# Patient Record
Sex: FEMALE | Race: WHITE | Employment: OTHER | URBAN - METROPOLITAN AREA
[De-identification: names, ages, dates, MRNs, and addresses within clinical notes are randomized per-mention and may not be internally consistent; named-entity substitution may affect disease eponyms.]

---

## 2017-01-06 ENCOUNTER — TELEPHONE (OUTPATIENT)
Dept: ORTHOPEDIC SURGERY | Age: 77
End: 2017-01-06

## 2017-01-19 VITALS — RESPIRATION RATE: 16 BRPM | WEIGHT: 160 LBS | HEIGHT: 60 IN | BODY MASS INDEX: 31.41 KG/M2

## 2017-01-20 ENCOUNTER — TELEPHONE (OUTPATIENT)
Dept: ORTHOPEDIC SURGERY | Age: 77
End: 2017-01-20

## 2017-01-20 ENCOUNTER — OFFICE VISIT (OUTPATIENT)
Dept: ORTHOPEDIC SURGERY | Age: 77
End: 2017-01-20

## 2017-01-20 DIAGNOSIS — M75.102 TEAR OF LEFT ROTATOR CUFF, UNSPECIFIED TEAR EXTENT: ICD-10-CM

## 2017-01-20 DIAGNOSIS — Z01.818 PREOP EXAMINATION: ICD-10-CM

## 2017-01-20 DIAGNOSIS — M67.431 GANGLION CYST OF WRIST, RIGHT: Primary | ICD-10-CM

## 2017-01-20 PROCEDURE — 99213 OFFICE O/P EST LOW 20 MIN: CPT | Performed by: ORTHOPAEDIC SURGERY

## 2017-01-20 RX ORDER — OXYCODONE HYDROCHLORIDE AND ACETAMINOPHEN 5; 325 MG/1; MG/1
1 TABLET ORAL EVERY 4 HOURS PRN
Qty: 30 TABLET | Refills: 0 | Status: SHIPPED | OUTPATIENT
Start: 2017-01-20 | End: 2018-10-19

## 2017-01-20 RX ORDER — PROMETHAZINE HYDROCHLORIDE 25 MG/1
25 TABLET ORAL EVERY 8 HOURS PRN
Qty: 10 TABLET | Refills: 0 | Status: SHIPPED | OUTPATIENT
Start: 2017-01-20 | End: 2018-10-19

## 2017-02-09 ENCOUNTER — HOSPITAL ENCOUNTER (OUTPATIENT)
Dept: PREADMISSION TESTING | Age: 77
Discharge: HOME | End: 2017-02-09
Attending: ORTHOPAEDIC SURGERY | Admitting: ORTHOPAEDIC SURGERY

## 2017-02-09 VITALS
TEMPERATURE: 98.4 F | HEART RATE: 72 BPM | SYSTOLIC BLOOD PRESSURE: 173 MMHG | HEIGHT: 58 IN | DIASTOLIC BLOOD PRESSURE: 78 MMHG | BODY MASS INDEX: 32.64 KG/M2 | RESPIRATION RATE: 16 BRPM | OXYGEN SATURATION: 94 % | WEIGHT: 155.5 LBS

## 2017-02-09 LAB
ANION GAP SERPL CALCULATED.3IONS-SCNC: 14 MMOL/L (ref 4–16)
BUN BLDV-MCNC: 19 MG/DL (ref 6–23)
CALCIUM SERPL-MCNC: 9.4 MG/DL (ref 8.3–10.6)
CHLORIDE BLD-SCNC: 102 MMOL/L (ref 99–110)
CO2: 25 MMOL/L (ref 21–32)
CREAT SERPL-MCNC: 0.7 MG/DL (ref 0.6–1.1)
EKG ATRIAL RATE: 58 BPM
EKG DIAGNOSIS: NORMAL
EKG P AXIS: 22 DEGREES
EKG P-R INTERVAL: 150 MS
EKG Q-T INTERVAL: 410 MS
EKG QRS DURATION: 74 MS
EKG QTC CALCULATION (BAZETT): 402 MS
EKG R AXIS: 2 DEGREES
EKG T AXIS: 69 DEGREES
EKG VENTRICULAR RATE: 58 BPM
GFR AFRICAN AMERICAN: >60 ML/MIN/1.73M2
GFR NON-AFRICAN AMERICAN: >60 ML/MIN/1.73M2
GLUCOSE BLD-MCNC: 117 MG/DL (ref 70–140)
HCT VFR BLD CALC: 46.4 % (ref 37–47)
HEMOGLOBIN: 15.3 GM/DL (ref 12.5–16)
MCH RBC QN AUTO: 29.7 PG (ref 27–31)
MCHC RBC AUTO-ENTMCNC: 33 % (ref 32–36)
MCV RBC AUTO: 90.1 FL (ref 78–100)
PDW BLD-RTO: 13.2 % (ref 11.7–14.9)
PLATELET # BLD: 190 K/CU MM (ref 140–440)
PMV BLD AUTO: 10.3 FL (ref 7.5–11.1)
POTASSIUM SERPL-SCNC: 3.9 MMOL/L (ref 3.5–5.1)
RBC # BLD: 5.15 M/CU MM (ref 4.2–5.4)
SODIUM BLD-SCNC: 141 MMOL/L (ref 135–145)
WBC # BLD: 6.4 K/CU MM (ref 4–10.5)

## 2017-02-09 RX ORDER — OMEPRAZOLE 40 MG/1
40 CAPSULE, DELAYED RELEASE ORAL PRN
COMMUNITY

## 2017-02-15 ENCOUNTER — HOSPITAL ENCOUNTER (OUTPATIENT)
Dept: SURGERY | Age: 77
Discharge: HOME | End: 2017-02-15
Attending: ORTHOPAEDIC SURGERY | Admitting: ORTHOPAEDIC SURGERY

## 2017-02-15 VITALS
HEART RATE: 75 BPM | HEIGHT: 58 IN | SYSTOLIC BLOOD PRESSURE: 124 MMHG | DIASTOLIC BLOOD PRESSURE: 79 MMHG | BODY MASS INDEX: 32.12 KG/M2 | RESPIRATION RATE: 16 BRPM | TEMPERATURE: 97.4 F | WEIGHT: 153 LBS | OXYGEN SATURATION: 97 %

## 2017-02-15 RX ORDER — FENTANYL CITRATE 50 UG/ML
25 INJECTION, SOLUTION INTRAMUSCULAR; INTRAVENOUS EVERY 5 MIN PRN
Status: DISCONTINUED | OUTPATIENT
Start: 2017-02-15 | End: 2017-02-16 | Stop reason: HOSPADM

## 2017-02-15 RX ORDER — SODIUM CHLORIDE, SODIUM LACTATE, POTASSIUM CHLORIDE, CALCIUM CHLORIDE 600; 310; 30; 20 MG/100ML; MG/100ML; MG/100ML; MG/100ML
INJECTION, SOLUTION INTRAVENOUS CONTINUOUS
Status: DISCONTINUED | OUTPATIENT
Start: 2017-02-15 | End: 2017-02-16 | Stop reason: HOSPADM

## 2017-02-15 RX ORDER — ONDANSETRON 2 MG/ML
4 INJECTION INTRAMUSCULAR; INTRAVENOUS
Status: ACTIVE | OUTPATIENT
Start: 2017-02-15 | End: 2017-02-15

## 2017-02-15 RX ORDER — LABETALOL HYDROCHLORIDE 5 MG/ML
5 INJECTION, SOLUTION INTRAVENOUS EVERY 10 MIN PRN
Status: DISCONTINUED | OUTPATIENT
Start: 2017-02-15 | End: 2017-02-16 | Stop reason: HOSPADM

## 2017-02-15 RX ORDER — SODIUM CHLORIDE 0.9 % (FLUSH) 0.9 %
10 SYRINGE (ML) INJECTION PRN
Status: DISCONTINUED | OUTPATIENT
Start: 2017-02-15 | End: 2017-02-16 | Stop reason: HOSPADM

## 2017-02-15 RX ORDER — MORPHINE SULFATE 2 MG/ML
2 INJECTION, SOLUTION INTRAMUSCULAR; INTRAVENOUS EVERY 5 MIN PRN
Status: DISCONTINUED | OUTPATIENT
Start: 2017-02-15 | End: 2017-02-16 | Stop reason: HOSPADM

## 2017-02-15 RX ORDER — SODIUM CHLORIDE 0.9 % (FLUSH) 0.9 %
10 SYRINGE (ML) INJECTION EVERY 12 HOURS SCHEDULED
Status: DISCONTINUED | OUTPATIENT
Start: 2017-02-15 | End: 2017-02-16 | Stop reason: HOSPADM

## 2017-02-15 RX ORDER — OXYCODONE HYDROCHLORIDE 5 MG/1
5 TABLET ORAL
Status: ACTIVE | OUTPATIENT
Start: 2017-02-15 | End: 2017-02-15

## 2017-02-15 RX ADMIN — SODIUM CHLORIDE, SODIUM LACTATE, POTASSIUM CHLORIDE, CALCIUM CHLORIDE: 600; 310; 30; 20 INJECTION, SOLUTION INTRAVENOUS at 12:31

## 2017-02-15 ASSESSMENT — PAIN SCALES - GENERAL
PAINLEVEL_OUTOF10: 0

## 2017-02-15 ASSESSMENT — PAIN - FUNCTIONAL ASSESSMENT: PAIN_FUNCTIONAL_ASSESSMENT: 0-10

## 2017-02-21 ENCOUNTER — OFFICE VISIT (OUTPATIENT)
Dept: ORTHOPEDIC SURGERY | Age: 77
End: 2017-02-21

## 2017-02-21 VITALS — HEIGHT: 58 IN | RESPIRATION RATE: 16 BRPM | BODY MASS INDEX: 32.12 KG/M2 | WEIGHT: 153 LBS

## 2017-02-21 DIAGNOSIS — M67.431 GANGLION CYST OF WRIST, RIGHT: ICD-10-CM

## 2017-02-21 DIAGNOSIS — Z09 POSTOP CHECK: Primary | ICD-10-CM

## 2017-02-21 PROCEDURE — 99024 POSTOP FOLLOW-UP VISIT: CPT | Performed by: PHYSICIAN ASSISTANT

## 2017-03-03 ENCOUNTER — OFFICE VISIT (OUTPATIENT)
Dept: ORTHOPEDIC SURGERY | Age: 77
End: 2017-03-03

## 2017-03-03 DIAGNOSIS — Z09 POSTOP CHECK: Primary | ICD-10-CM

## 2017-03-03 DIAGNOSIS — M67.431 GANGLION CYST OF WRIST, RIGHT: ICD-10-CM

## 2017-03-03 PROCEDURE — 99024 POSTOP FOLLOW-UP VISIT: CPT | Performed by: ORTHOPAEDIC SURGERY

## 2017-04-03 ENCOUNTER — TELEPHONE (OUTPATIENT)
Dept: GASTROENTEROLOGY | Age: 77
End: 2017-04-03

## 2017-04-05 ENCOUNTER — TELEPHONE (OUTPATIENT)
Dept: GASTROENTEROLOGY | Age: 77
End: 2017-04-05

## 2017-04-05 ENCOUNTER — HOSPITAL ENCOUNTER (OUTPATIENT)
Dept: SURGERY | Age: 77
Discharge: HOME | End: 2017-04-05
Attending: INTERNAL MEDICINE | Admitting: INTERNAL MEDICINE

## 2017-04-05 VITALS
HEIGHT: 59 IN | TEMPERATURE: 97.2 F | DIASTOLIC BLOOD PRESSURE: 98 MMHG | HEART RATE: 70 BPM | BODY MASS INDEX: 31.25 KG/M2 | OXYGEN SATURATION: 95 % | WEIGHT: 155 LBS | RESPIRATION RATE: 16 BRPM | SYSTOLIC BLOOD PRESSURE: 128 MMHG

## 2017-04-05 PROCEDURE — 45378 DIAGNOSTIC COLONOSCOPY: CPT | Performed by: INTERNAL MEDICINE

## 2017-04-05 RX ORDER — SODIUM CHLORIDE, SODIUM LACTATE, POTASSIUM CHLORIDE, CALCIUM CHLORIDE 600; 310; 30; 20 MG/100ML; MG/100ML; MG/100ML; MG/100ML
INJECTION, SOLUTION INTRAVENOUS CONTINUOUS
Status: DISCONTINUED | OUTPATIENT
Start: 2017-04-05 | End: 2017-04-06 | Stop reason: HOSPADM

## 2017-04-05 RX ADMIN — SODIUM CHLORIDE, SODIUM LACTATE, POTASSIUM CHLORIDE, CALCIUM CHLORIDE: 600; 310; 30; 20 INJECTION, SOLUTION INTRAVENOUS at 12:11

## 2017-04-05 ASSESSMENT — PAIN SCALES - GENERAL
PAINLEVEL_OUTOF10: 0
PAINLEVEL_OUTOF10: 0

## 2017-04-05 ASSESSMENT — PAIN - FUNCTIONAL ASSESSMENT: PAIN_FUNCTIONAL_ASSESSMENT: 0-10

## 2017-04-19 ENCOUNTER — OFFICE VISIT (OUTPATIENT)
Dept: GASTROENTEROLOGY | Age: 77
End: 2017-04-19

## 2017-04-19 VITALS
OXYGEN SATURATION: 95 % | DIASTOLIC BLOOD PRESSURE: 72 MMHG | SYSTOLIC BLOOD PRESSURE: 112 MMHG | WEIGHT: 158 LBS | HEIGHT: 60 IN | BODY MASS INDEX: 31.02 KG/M2 | HEART RATE: 80 BPM

## 2017-04-19 DIAGNOSIS — K21.9 GASTROESOPHAGEAL REFLUX DISEASE WITHOUT ESOPHAGITIS: Primary | ICD-10-CM

## 2017-04-19 DIAGNOSIS — K57.30 DIVERTICULOSIS OF LARGE INTESTINE WITHOUT HEMORRHAGE: ICD-10-CM

## 2017-04-19 DIAGNOSIS — Z87.19 HISTORY OF DIVERTICULITIS: ICD-10-CM

## 2017-04-19 PROCEDURE — 99214 OFFICE O/P EST MOD 30 MIN: CPT | Performed by: NURSE PRACTITIONER

## 2017-04-19 RX ORDER — FLUTICASONE PROPIONATE 50 MCG
SPRAY, SUSPENSION (ML) NASAL
COMMUNITY
Start: 2017-03-31 | End: 2019-06-13

## 2017-04-19 ASSESSMENT — ENCOUNTER SYMPTOMS
COUGH: 0
SHORTNESS OF BREATH: 0
HEARTBURN: 1
SPUTUM PRODUCTION: 0
DOUBLE VISION: 0
VOMITING: 0
BLURRED VISION: 0
ORTHOPNEA: 0
EYE PAIN: 0
PHOTOPHOBIA: 0
BLOOD IN STOOL: 0
CONSTIPATION: 0
BACK PAIN: 0
NAUSEA: 0
DIARRHEA: 0
ABDOMINAL PAIN: 0

## 2018-10-19 ENCOUNTER — OFFICE VISIT (OUTPATIENT)
Dept: FAMILY MEDICINE CLINIC | Age: 78
End: 2018-10-19
Payer: MEDICARE

## 2018-10-19 VITALS
DIASTOLIC BLOOD PRESSURE: 72 MMHG | HEIGHT: 60 IN | HEART RATE: 87 BPM | BODY MASS INDEX: 32.2 KG/M2 | SYSTOLIC BLOOD PRESSURE: 124 MMHG | WEIGHT: 164 LBS | OXYGEN SATURATION: 94 % | RESPIRATION RATE: 14 BRPM

## 2018-10-19 DIAGNOSIS — N30.01 ACUTE CYSTITIS WITH HEMATURIA: Primary | ICD-10-CM

## 2018-10-19 DIAGNOSIS — R39.9 UTI SYMPTOMS: ICD-10-CM

## 2018-10-19 DIAGNOSIS — Z23 NEED FOR IMMUNIZATION AGAINST INFLUENZA: ICD-10-CM

## 2018-10-19 LAB
BILIRUBIN, POC: ABNORMAL
BLOOD URINE, POC: ABNORMAL
CLARITY, POC: ABNORMAL
COLOR, POC: ABNORMAL
GLUCOSE URINE, POC: ABNORMAL
KETONES, POC: ABNORMAL
LEUKOCYTE EST, POC: ABNORMAL
NITRITE, POC: NEGATIVE
PH, POC: 5
PROTEIN, POC: ABNORMAL
SPECIFIC GRAVITY, POC: 1.03
UROBILINOGEN, POC: 0.2

## 2018-10-19 PROCEDURE — 99202 OFFICE O/P NEW SF 15 MIN: CPT | Performed by: NURSE PRACTITIONER

## 2018-10-19 PROCEDURE — G0008 ADMIN INFLUENZA VIRUS VAC: HCPCS | Performed by: NURSE PRACTITIONER

## 2018-10-19 PROCEDURE — 90662 IIV NO PRSV INCREASED AG IM: CPT | Performed by: NURSE PRACTITIONER

## 2018-10-19 PROCEDURE — 81002 URINALYSIS NONAUTO W/O SCOPE: CPT | Performed by: NURSE PRACTITIONER

## 2018-10-19 RX ORDER — PHENAZOPYRIDINE HYDROCHLORIDE 100 MG/1
100 TABLET, FILM COATED ORAL 3 TIMES DAILY PRN
Qty: 9 TABLET | Refills: 0 | Status: SHIPPED | OUTPATIENT
Start: 2018-10-19 | End: 2018-10-22

## 2018-10-19 RX ORDER — NITROFURANTOIN 25; 75 MG/1; MG/1
100 CAPSULE ORAL 2 TIMES DAILY
Qty: 14 CAPSULE | Refills: 0 | Status: SHIPPED | OUTPATIENT
Start: 2018-10-19 | End: 2018-10-26

## 2018-10-19 ASSESSMENT — ENCOUNTER SYMPTOMS
RESPIRATORY NEGATIVE: 1
NAUSEA: 0
VOMITING: 0
DIARRHEA: 0

## 2018-10-19 ASSESSMENT — PATIENT HEALTH QUESTIONNAIRE - PHQ9
SUM OF ALL RESPONSES TO PHQ QUESTIONS 1-9: 0
2. FEELING DOWN, DEPRESSED OR HOPELESS: 0
SUM OF ALL RESPONSES TO PHQ9 QUESTIONS 1 & 2: 0
1. LITTLE INTEREST OR PLEASURE IN DOING THINGS: 0
SUM OF ALL RESPONSES TO PHQ QUESTIONS 1-9: 0

## 2018-10-19 NOTE — PROGRESS NOTES
Vaccine Information Sheet, \"Influenza - Inactivated\"  given to Farideh Monahan, or parent/legal guardian of  Farideh Monahan and verbalized understanding. Patient responses:    Have you ever had a reaction to a flu vaccine? No  Are you able to eat eggs without adverse effects? Yes  Do you have any current illness? No  Have you ever had Guillian [place] Syndrome? No    Flu vaccine given per order. Please see immunization tab.
for this visit. Past medical, family,surgical history reviewed today. Objective:  /72 (Site: Left Upper Arm, Position: Sitting, Cuff Size: Large Adult)   Pulse 87   Resp 14   Ht 5' (1.524 m)   Wt 164 lb (74.4 kg)   SpO2 94%   BMI 32.03 kg/m²   BP Readings from Last 3 Encounters:   10/19/18 124/72   04/19/17 112/72   04/05/17 (!) 128/98     Wt Readings from Last 3 Encounters:   10/19/18 164 lb (74.4 kg)   04/19/17 158 lb (71.7 kg)   04/05/17 155 lb (70.3 kg)         Physical Exam   Constitutional: She is oriented to person, place, and time. She appears well-developed and well-nourished. HENT:   Head: Normocephalic. Neck: Neck supple. Cardiovascular: Normal rate, regular rhythm and normal heart sounds. Pulmonary/Chest: Effort normal and breath sounds normal.   Neurological: She is alert and oriented to person, place, and time. Skin: Skin is warm and dry. Psychiatric: She has a normal mood and affect.  Her behavior is normal.       Lab Results   Component Value Date    WBC 6.4 02/09/2017    HGB 15.3 02/09/2017    HCT 46.4 02/09/2017    MCV 90.1 02/09/2017     02/09/2017     Lab Results   Component Value Date     02/09/2017    K 3.9 02/09/2017     02/09/2017    CO2 25 02/09/2017    BUN 19 02/09/2017    CREATININE 0.7 02/09/2017    GLUCOSE 117 02/09/2017    CALCIUM 9.4 02/09/2017    LABGLOM >60 02/09/2017    GFRAA >60 02/09/2017     No results found for: CHOL  No results found for: TRIG  No results found for: HDL  No results found for: LDLCALC, LDLCHOLESTEROL  No results found for: LABA1C  No results found for: TSHFT4, TSH, TSHHS    Results for orders placed or performed in visit on 10/19/18   POCT Urinalysis no Micro   Result Value Ref Range    Color, UA Dark Yolanda     Clarity, UA Cloudy     Glucose, UA POC Negatve     Bilirubin, UA Small     Ketones, UA Trace     Spec Grav, UA 1.030     Blood, UA POC Moderate     pH, UA 5.0     Protein, UA POC 30 mg/dL

## 2018-10-22 LAB
ORGANISM: ABNORMAL
URINE CULTURE, ROUTINE: ABNORMAL
URINE CULTURE, ROUTINE: ABNORMAL

## 2018-11-09 ENCOUNTER — OFFICE VISIT (OUTPATIENT)
Dept: FAMILY MEDICINE CLINIC | Age: 78
End: 2018-11-09
Payer: MEDICARE

## 2018-11-09 VITALS
RESPIRATION RATE: 14 BRPM | HEART RATE: 83 BPM | OXYGEN SATURATION: 97 % | SYSTOLIC BLOOD PRESSURE: 122 MMHG | WEIGHT: 161 LBS | BODY MASS INDEX: 31.61 KG/M2 | DIASTOLIC BLOOD PRESSURE: 78 MMHG | HEIGHT: 60 IN | TEMPERATURE: 98.4 F

## 2018-11-09 DIAGNOSIS — J40 BRONCHITIS: Primary | ICD-10-CM

## 2018-11-09 PROCEDURE — 99213 OFFICE O/P EST LOW 20 MIN: CPT | Performed by: NURSE PRACTITIONER

## 2018-11-09 RX ORDER — BENZONATATE 100 MG/1
100 CAPSULE ORAL 3 TIMES DAILY PRN
Qty: 20 CAPSULE | Refills: 0 | Status: SHIPPED | OUTPATIENT
Start: 2018-11-09 | End: 2018-11-14 | Stop reason: SDUPTHER

## 2018-11-09 RX ORDER — AZITHROMYCIN 250 MG/1
TABLET, FILM COATED ORAL
Qty: 1 PACKET | Refills: 0 | Status: SHIPPED | OUTPATIENT
Start: 2018-11-09 | End: 2018-11-13

## 2018-11-09 RX ORDER — PREDNISONE 20 MG/1
TABLET ORAL
Qty: 18 TABLET | Refills: 0 | Status: SHIPPED | OUTPATIENT
Start: 2018-11-09 | End: 2018-11-14 | Stop reason: ALTCHOICE

## 2018-11-09 ASSESSMENT — ENCOUNTER SYMPTOMS
VOMITING: 0
DIARRHEA: 1
COUGH: 1
SINUS PAIN: 0
SINUS PRESSURE: 0
RHINORRHEA: 0
SORE THROAT: 1
WHEEZING: 1
CHEST TIGHTNESS: 1
NAUSEA: 0
SHORTNESS OF BREATH: 1

## 2018-11-14 ENCOUNTER — HOSPITAL ENCOUNTER (OUTPATIENT)
Dept: GENERAL RADIOLOGY | Age: 78
Discharge: HOME | End: 2018-11-14
Payer: MEDICARE

## 2018-11-14 ENCOUNTER — OFFICE VISIT (OUTPATIENT)
Dept: FAMILY MEDICINE CLINIC | Age: 78
End: 2018-11-14
Payer: MEDICARE

## 2018-11-14 ENCOUNTER — HOSPITAL ENCOUNTER (OUTPATIENT)
Age: 78
Discharge: HOME | End: 2018-11-14
Payer: MEDICARE

## 2018-11-14 VITALS
TEMPERATURE: 97.8 F | RESPIRATION RATE: 16 BRPM | DIASTOLIC BLOOD PRESSURE: 68 MMHG | BODY MASS INDEX: 31.02 KG/M2 | SYSTOLIC BLOOD PRESSURE: 122 MMHG | HEART RATE: 80 BPM | WEIGHT: 158 LBS | OXYGEN SATURATION: 98 % | HEIGHT: 60 IN

## 2018-11-14 DIAGNOSIS — R05.9 COUGH: ICD-10-CM

## 2018-11-14 DIAGNOSIS — R05.9 COUGH: Primary | ICD-10-CM

## 2018-11-14 PROCEDURE — 71046 X-RAY EXAM CHEST 2 VIEWS: CPT

## 2018-11-14 PROCEDURE — 99213 OFFICE O/P EST LOW 20 MIN: CPT | Performed by: NURSE PRACTITIONER

## 2018-11-14 RX ORDER — BENZONATATE 100 MG/1
100 CAPSULE ORAL 3 TIMES DAILY PRN
Qty: 20 CAPSULE | Refills: 0 | Status: SHIPPED | OUTPATIENT
Start: 2018-11-14 | End: 2019-03-22

## 2018-11-14 RX ORDER — ALBUTEROL SULFATE 90 UG/1
2 AEROSOL, METERED RESPIRATORY (INHALATION) EVERY 6 HOURS PRN
Qty: 1 INHALER | Refills: 0 | Status: SHIPPED | OUTPATIENT
Start: 2018-11-14 | End: 2019-07-12 | Stop reason: SDUPTHER

## 2018-11-14 RX ORDER — LEVOFLOXACIN 500 MG/1
500 TABLET, FILM COATED ORAL DAILY
Qty: 7 TABLET | Refills: 0 | Status: SHIPPED | OUTPATIENT
Start: 2018-11-14 | End: 2018-11-21

## 2018-11-14 ASSESSMENT — ENCOUNTER SYMPTOMS
SHORTNESS OF BREATH: 1
WHEEZING: 1
DIARRHEA: 0
RHINORRHEA: 1
NAUSEA: 0
HEARTBURN: 0
VOMITING: 0
SORE THROAT: 1
SINUS PRESSURE: 0
SINUS PAIN: 0
COUGH: 1
HEMOPTYSIS: 0
CHEST TIGHTNESS: 0

## 2019-03-22 ENCOUNTER — OFFICE VISIT (OUTPATIENT)
Dept: FAMILY MEDICINE CLINIC | Age: 79
End: 2019-03-22
Payer: MEDICARE

## 2019-03-22 VITALS
SYSTOLIC BLOOD PRESSURE: 136 MMHG | WEIGHT: 163.2 LBS | OXYGEN SATURATION: 91 % | BODY MASS INDEX: 31.87 KG/M2 | HEART RATE: 101 BPM | RESPIRATION RATE: 16 BRPM | DIASTOLIC BLOOD PRESSURE: 80 MMHG | TEMPERATURE: 98.5 F

## 2019-03-22 DIAGNOSIS — J06.9 VIRAL URI: Primary | ICD-10-CM

## 2019-03-22 PROCEDURE — 1123F ACP DISCUSS/DSCN MKR DOCD: CPT | Performed by: NURSE PRACTITIONER

## 2019-03-22 PROCEDURE — G8482 FLU IMMUNIZE ORDER/ADMIN: HCPCS | Performed by: NURSE PRACTITIONER

## 2019-03-22 PROCEDURE — G8417 CALC BMI ABV UP PARAM F/U: HCPCS | Performed by: NURSE PRACTITIONER

## 2019-03-22 PROCEDURE — 4040F PNEUMOC VAC/ADMIN/RCVD: CPT | Performed by: NURSE PRACTITIONER

## 2019-03-22 PROCEDURE — 99213 OFFICE O/P EST LOW 20 MIN: CPT | Performed by: NURSE PRACTITIONER

## 2019-03-22 PROCEDURE — 1036F TOBACCO NON-USER: CPT | Performed by: NURSE PRACTITIONER

## 2019-03-22 PROCEDURE — 1090F PRES/ABSN URINE INCON ASSESS: CPT | Performed by: NURSE PRACTITIONER

## 2019-03-22 PROCEDURE — 1101F PT FALLS ASSESS-DOCD LE1/YR: CPT | Performed by: NURSE PRACTITIONER

## 2019-03-22 PROCEDURE — G8400 PT W/DXA NO RESULTS DOC: HCPCS | Performed by: NURSE PRACTITIONER

## 2019-03-22 PROCEDURE — G8427 DOCREV CUR MEDS BY ELIG CLIN: HCPCS | Performed by: NURSE PRACTITIONER

## 2019-03-22 RX ORDER — METHYLPREDNISOLONE 4 MG/1
TABLET ORAL
Qty: 1 KIT | Refills: 0 | Status: SHIPPED | OUTPATIENT
Start: 2019-03-22 | End: 2019-03-28

## 2019-03-22 RX ORDER — BENZONATATE 100 MG/1
100 CAPSULE ORAL 3 TIMES DAILY PRN
Qty: 20 CAPSULE | Refills: 0 | Status: SHIPPED | OUTPATIENT
Start: 2019-03-22 | End: 2019-04-05 | Stop reason: SDUPTHER

## 2019-03-22 ASSESSMENT — ENCOUNTER SYMPTOMS
SHORTNESS OF BREATH: 0
DIARRHEA: 0
SINUS PRESSURE: 1
NAUSEA: 0
COUGH: 1
SORE THROAT: 1
VOMITING: 0
RHINORRHEA: 1
SWOLLEN GLANDS: 0
WHEEZING: 0
CHEST TIGHTNESS: 0
SINUS PAIN: 1
ABDOMINAL PAIN: 0

## 2019-03-22 ASSESSMENT — PATIENT HEALTH QUESTIONNAIRE - PHQ9
1. LITTLE INTEREST OR PLEASURE IN DOING THINGS: 0
2. FEELING DOWN, DEPRESSED OR HOPELESS: 0
SUM OF ALL RESPONSES TO PHQ QUESTIONS 1-9: 0
SUM OF ALL RESPONSES TO PHQ QUESTIONS 1-9: 0
SUM OF ALL RESPONSES TO PHQ9 QUESTIONS 1 & 2: 0

## 2019-04-05 ENCOUNTER — OFFICE VISIT (OUTPATIENT)
Dept: FAMILY MEDICINE CLINIC | Age: 79
End: 2019-04-05
Payer: MEDICARE

## 2019-04-05 VITALS
DIASTOLIC BLOOD PRESSURE: 84 MMHG | TEMPERATURE: 98.9 F | RESPIRATION RATE: 16 BRPM | HEIGHT: 60 IN | BODY MASS INDEX: 32.08 KG/M2 | SYSTOLIC BLOOD PRESSURE: 116 MMHG | WEIGHT: 163.4 LBS

## 2019-04-05 DIAGNOSIS — J01.90 ACUTE BACTERIAL SINUSITIS: Primary | ICD-10-CM

## 2019-04-05 DIAGNOSIS — B96.89 ACUTE BACTERIAL SINUSITIS: Primary | ICD-10-CM

## 2019-04-05 DIAGNOSIS — R05.9 COUGH: ICD-10-CM

## 2019-04-05 PROCEDURE — 1090F PRES/ABSN URINE INCON ASSESS: CPT | Performed by: NURSE PRACTITIONER

## 2019-04-05 PROCEDURE — G8427 DOCREV CUR MEDS BY ELIG CLIN: HCPCS | Performed by: NURSE PRACTITIONER

## 2019-04-05 PROCEDURE — 1036F TOBACCO NON-USER: CPT | Performed by: NURSE PRACTITIONER

## 2019-04-05 PROCEDURE — 4040F PNEUMOC VAC/ADMIN/RCVD: CPT | Performed by: NURSE PRACTITIONER

## 2019-04-05 PROCEDURE — 1123F ACP DISCUSS/DSCN MKR DOCD: CPT | Performed by: NURSE PRACTITIONER

## 2019-04-05 PROCEDURE — 99213 OFFICE O/P EST LOW 20 MIN: CPT | Performed by: NURSE PRACTITIONER

## 2019-04-05 PROCEDURE — G8417 CALC BMI ABV UP PARAM F/U: HCPCS | Performed by: NURSE PRACTITIONER

## 2019-04-05 PROCEDURE — G8400 PT W/DXA NO RESULTS DOC: HCPCS | Performed by: NURSE PRACTITIONER

## 2019-04-05 RX ORDER — AMOXICILLIN 875 MG/1
875 TABLET, COATED ORAL 2 TIMES DAILY
Qty: 20 TABLET | Refills: 0 | Status: SHIPPED | OUTPATIENT
Start: 2019-04-05 | End: 2019-04-15

## 2019-04-05 RX ORDER — LORATADINE 10 MG/1
10 TABLET ORAL DAILY
Qty: 30 TABLET | Refills: 1 | Status: SHIPPED | OUTPATIENT
Start: 2019-04-05 | End: 2019-06-04

## 2019-04-05 RX ORDER — BENZONATATE 100 MG/1
100 CAPSULE ORAL 3 TIMES DAILY PRN
Qty: 20 CAPSULE | Refills: 0 | Status: SHIPPED | OUTPATIENT
Start: 2019-04-05 | End: 2019-06-13

## 2019-04-05 ASSESSMENT — ENCOUNTER SYMPTOMS
SHORTNESS OF BREATH: 0
DIARRHEA: 0
SINUS PRESSURE: 1
SINUS PAIN: 1
HEMOPTYSIS: 0
SORE THROAT: 0
COUGH: 1
VOMITING: 0
HEARTBURN: 0
WHEEZING: 0
CHEST TIGHTNESS: 0
NAUSEA: 0
RHINORRHEA: 0

## 2019-04-05 NOTE — PROGRESS NOTES
Ann-Marie Gordon   66 y.o.  female  [id number]      Chief Complaint   Patient presents with    Cough     Pt states that she was in 2 weeks ago for sinus related sx and was dx with virus, took a z-pack, and has been using OTC medications w/o relief. Subjective:  66 y. o.female is here for a follow up. She has the following chronic/acute medical problems:  Patient Active Problem List   Diagnosis    Disorder of rotator cuff    LLQ abdominal pain     Patient is here with a cough but believes it may be related to her sinuses. Cough   This is a new problem. The current episode started 1 to 4 weeks ago (2 weeks ago). The problem has been unchanged. Episode frequency: intermittantly. The cough is non-productive. Associated symptoms include nasal congestion and postnasal drip. Pertinent negatives include no chest pain, chills, ear congestion, ear pain, fever, headaches, heartburn, hemoptysis, myalgias, rash, rhinorrhea, sore throat, shortness of breath, sweats, weight loss or wheezing. Nothing aggravates the symptoms. Treatments tried: Medrol Dose Pack/ Tessalon Perles. The treatment provided mild relief. Her past medical history is significant for COPD. Review of Systems   Constitutional: Negative for appetite change, chills, fatigue, fever and weight loss. HENT: Positive for postnasal drip, sinus pressure, sinus pain and sneezing. Negative for congestion, ear pain, rhinorrhea and sore throat. Respiratory: Positive for cough. Negative for hemoptysis, chest tightness, shortness of breath and wheezing. Cardiovascular: Negative for chest pain and palpitations. Gastrointestinal: Negative for diarrhea, heartburn, nausea and vomiting. Musculoskeletal: Negative for myalgias. Skin: Negative for rash. Neurological: Negative for dizziness, light-headedness and headaches.        Current Outpatient Medications   Medication Sig Dispense Refill    amoxicillin (AMOXIL) 875 MG tablet Take 1 tablet by WBC 6.4 02/09/2017    HGB 15.3 02/09/2017    HCT 46.4 02/09/2017    MCV 90.1 02/09/2017     02/09/2017     Lab Results   Component Value Date     02/09/2017    K 3.9 02/09/2017     02/09/2017    CO2 25 02/09/2017    BUN 19 02/09/2017    CREATININE 0.7 02/09/2017    GLUCOSE 117 02/09/2017    CALCIUM 9.4 02/09/2017    LABGLOM >60 02/09/2017    GFRAA >60 02/09/2017     No results found for: CHOL  No results found for: TRIG  No results found for: HDL  No results found for: LDLCALC, LDLCHOLESTEROL  No results found for: LABA1C  No results found for: TSHFT4, TSH, TSHHS      ASSESSMENT/PLAN:      1. Acute bacterial sinusitis  Encouraged fluids. - amoxicillin (AMOXIL) 875 MG tablet; Take 1 tablet by mouth 2 times daily for 10 days  Dispense: 20 tablet; Refill: 0  - loratadine (CLARITIN) 10 MG tablet; Take 1 tablet by mouth daily  Dispense: 30 tablet; Refill: 1    2. Cough  - benzonatate (TESSALON PERLES) 100 MG capsule; Take 1 capsule by mouth 3 times daily as needed for Cough  Dispense: 20 capsule; Refill: 0              No orders of the defined types were placed in this encounter. Care discussed with patient. Questions answered. Patient verbalizes understanding and agrees with plan. After visit summary provided. Advised to call for any problems, questions, or concerns. Return if symptoms worsen or fail to improve.                                              Signed:  Hazel Merlin, APRN - CNP  04/05/19  9:59 AM

## 2019-05-29 ENCOUNTER — TELEPHONE (OUTPATIENT)
Dept: CARDIOLOGY CLINIC | Age: 79
End: 2019-05-29

## 2019-05-29 NOTE — TELEPHONE ENCOUNTER
Left message for patient requesting a return call to schedule a consult for chest pain per referral from Dr. Heena Luna.

## 2019-06-11 ENCOUNTER — HOSPITAL ENCOUNTER (OUTPATIENT)
Dept: WOMENS IMAGING | Age: 79
Discharge: HOME | End: 2019-06-11
Payer: MEDICARE

## 2019-06-11 ENCOUNTER — HOSPITAL ENCOUNTER (OUTPATIENT)
Age: 79
Discharge: HOME | End: 2019-06-11
Payer: MEDICARE

## 2019-06-11 DIAGNOSIS — Z12.31 SCREENING MAMMOGRAM, ENCOUNTER FOR: ICD-10-CM

## 2019-06-11 DIAGNOSIS — Z78.0 POST-MENOPAUSAL: ICD-10-CM

## 2019-06-11 LAB
ALBUMIN SERPL-MCNC: 4.2 GM/DL (ref 3.4–5)
ALP BLD-CCNC: 138 IU/L (ref 40–128)
ALT SERPL-CCNC: 21 U/L (ref 10–40)
ANION GAP SERPL CALCULATED.3IONS-SCNC: 12 MMOL/L (ref 4–16)
AST SERPL-CCNC: 27 IU/L (ref 15–37)
BILIRUB SERPL-MCNC: 0.8 MG/DL (ref 0–1)
BUN BLDV-MCNC: 16 MG/DL (ref 6–23)
CALCIUM SERPL-MCNC: 9.2 MG/DL (ref 8.3–10.6)
CHLORIDE BLD-SCNC: 105 MMOL/L (ref 99–110)
CHOLESTEROL: 205 MG/DL
CO2: 26 MMOL/L (ref 21–32)
CREAT SERPL-MCNC: 0.7 MG/DL (ref 0.6–1.1)
GFR AFRICAN AMERICAN: >60 ML/MIN/1.73M2
GFR NON-AFRICAN AMERICAN: >60 ML/MIN/1.73M2
GLUCOSE BLD-MCNC: 120 MG/DL (ref 70–99)
HDLC SERPL-MCNC: 62 MG/DL
LDL CHOLESTEROL DIRECT: 149 MG/DL
POTASSIUM SERPL-SCNC: 4.2 MMOL/L (ref 3.5–5.1)
SODIUM BLD-SCNC: 143 MMOL/L (ref 135–145)
TOTAL PROTEIN: 6.9 GM/DL (ref 6.4–8.2)
TRIGL SERPL-MCNC: 107 MG/DL

## 2019-06-11 PROCEDURE — 77080 DXA BONE DENSITY AXIAL: CPT

## 2019-06-11 PROCEDURE — 83721 ASSAY OF BLOOD LIPOPROTEIN: CPT

## 2019-06-11 PROCEDURE — 80053 COMPREHEN METABOLIC PANEL: CPT

## 2019-06-11 PROCEDURE — 80061 LIPID PANEL: CPT

## 2019-06-11 PROCEDURE — 77063 BREAST TOMOSYNTHESIS BI: CPT

## 2019-06-11 PROCEDURE — 36415 COLL VENOUS BLD VENIPUNCTURE: CPT

## 2019-06-13 ENCOUNTER — INITIAL CONSULT (OUTPATIENT)
Dept: CARDIOLOGY CLINIC | Age: 79
End: 2019-06-13
Payer: MEDICARE

## 2019-06-13 VITALS
BODY MASS INDEX: 32.86 KG/M2 | RESPIRATION RATE: 18 BRPM | DIASTOLIC BLOOD PRESSURE: 80 MMHG | WEIGHT: 163 LBS | SYSTOLIC BLOOD PRESSURE: 142 MMHG | HEIGHT: 59 IN

## 2019-06-13 DIAGNOSIS — E78.5 DYSLIPIDEMIA: ICD-10-CM

## 2019-06-13 DIAGNOSIS — R07.9 CHEST PAIN, UNSPECIFIED TYPE: Primary | ICD-10-CM

## 2019-06-13 DIAGNOSIS — R01.1 MURMUR: ICD-10-CM

## 2019-06-13 DIAGNOSIS — R07.2 PRECORDIAL PAIN: ICD-10-CM

## 2019-06-13 PROCEDURE — 99204 OFFICE O/P NEW MOD 45 MIN: CPT | Performed by: INTERNAL MEDICINE

## 2019-06-13 PROCEDURE — G8427 DOCREV CUR MEDS BY ELIG CLIN: HCPCS | Performed by: INTERNAL MEDICINE

## 2019-06-13 PROCEDURE — 93000 ELECTROCARDIOGRAM COMPLETE: CPT | Performed by: INTERNAL MEDICINE

## 2019-06-13 PROCEDURE — 1090F PRES/ABSN URINE INCON ASSESS: CPT | Performed by: INTERNAL MEDICINE

## 2019-06-13 PROCEDURE — G8417 CALC BMI ABV UP PARAM F/U: HCPCS | Performed by: INTERNAL MEDICINE

## 2019-06-13 RX ORDER — LORATADINE 10 MG/1
10 TABLET ORAL DAILY
COMMUNITY

## 2019-06-13 NOTE — ASSESSMENT & PLAN NOTE
She has systolic murmur , she will get an echo , this is new murmur but it could be aortic sclerosis

## 2019-06-13 NOTE — PROGRESS NOTES
CARDIOLOGY CONSULT   NOTE    Chief Complaint: Chest Pain    HPI:   Gisselle Reynaga is a 66y.o. year old who has Past medical history as noted below. She comes in for further evaluation due to ongoing episode of chest pain. She says that she has intermittent left-sided chest pain for a long time but few weeks back the pain got worse and it went to her left side of the neck and lasted several hours. She took several aspirin after which pain improved. She has not had similar kind of symptoms since then however she does have persistent reproducible chest pain      Current Outpatient Medications   Medication Sig Dispense Refill    loratadine (CLARITIN) 10 MG tablet Take 10 mg by mouth daily      albuterol sulfate HFA (PROVENTIL HFA) 108 (90 Base) MCG/ACT inhaler Inhale 2 puffs into the lungs every 6 hours as needed for Wheezing 1 Inhaler 0    omeprazole (PRILOSEC) 40 MG delayed release capsule Take 40 mg by mouth as needed       No current facility-administered medications for this visit. Allergies:   Patient has no known allergies.     Patient History:  Past Medical History:   Diagnosis Date    Acid reflux     Arthritis     \"Hands, Shoulders\"    Bladder prolapse, female, acquired     Bronchitis Last Episode 12-16    [facility]     Emphyse[facility] lung ([facility])     No Pulmonologist At This Time    [place] (hard of hearing)     Bilateral Ears    Hyperlipidemia     \"Borderline\"    Hypertension     \"Borderline\"    Kidney stone     Retention of urine     Tooth missing     Lower Left    Urinary incontinence     UTI (urinary tract infection) In Past    No Current Symptoms    Wears glasses      Past Surgical History:   Procedure Laterality Date    BLADDER SURGERY  Last Done In 1990's    \"3 Bladder Lifts\"    BLADDER SURGERY      last surgery 2003    COLONOSCOPY  1960's    COLONOSCOPY  04/05/2017    pan divertics, int hem    CYST or depression  · Endocrine: No malaise, fatigue or temperature intolerance  · Hematologic/Lymphatic: No bleeding problems, blood clots or swollen lymph nodes  · Allergic/Immunologic: No nasal congestion or hives    Objective:      Physical Exam:  BP (!) 142/80 (Site: Left Upper Arm, Position: Sitting, Cuff Size: Medium Adult)   Resp 18   Ht 4' 11\" (1.499 m)   Wt 163 lb (73.9 kg)   BMI 32.92 kg/m²   Wt Readings from Last 3 Encounters:   06/13/19 163 lb (73.9 kg)   04/05/19 163 lb 6.4 oz (74.1 kg)   03/22/19 163 lb 3.2 oz (74 kg)     Body mass index is 32.92 kg/m². Vitals:    06/13/19 1630   BP: (!) 142/80   Resp: 18        General Appearance:  No distress, conversant  Constitutional:  Well developed, Well nourished, No acute distress, Non-toxic appearance. HENT:  Normocephalic, Atraumatic, Bilateral external ears normal, Oropharynx moist, No oral exudates, Nose normal. Neck- Normal range of motion, No tenderness, Supple, No stridor,no apical-carotid delay  Eyes:  PERRL, EOMI, Conjunctiva normal, No discharge. Respiratory:  Normal breath sounds, No respiratory distress, No wheezing,positive  chest tenderness. ,no use of accessory muscles, NO crackles  Cardiovascular: (PMI) apex non displaced,no lifts no thrills,S1 and S2 audible, systolic 2/6 murmur, No signs of ankle edema, or volume overload, No evidence of JVD, No crackles  GI:  Bowel sounds normal, Soft, No tenderness, No masses, No gross visceromegaly   :  No costovertebral angle tenderness   Musculoskeletal:  No edema, no tenderness, no deformities.  Back- no tenderness  Integument:  Well hydrated, no rash   Lymphatic:  No lymphadenopathy noted   Neurologic:  Alert & oriented x 3, CN 2-12 normal, normal motor function, normal sensory function, no focal deficits noted   Psychiatric:  Speech and behavior appropriate       Medical decision making and Data review:  DATA:  No results found for: TROPONINT  BNP:  No results found for: PROBNP  PT/INR:  No brisk walk for 30 minutes  at least 3 to 4 times a week   Various goals were discussed and questions answered. Continue current medications. Appropriate prescriptions are addressed and refills ordered. Questions answered and patient verbalizes understanding. Call for any problems, questions, or concerns. Continue all other medications of all above medical condition listed as is. Return in about 1 month (around 7/13/2019). Please note this report has been partially produced using speech recognition software and may contain errors related to that system including errors in grammar, punctuation, and spelling, as well as words and phrases that may be inappropriate. If there are any questions or concerns please feel free to contact the dictating provider for clarification.

## 2019-06-13 NOTE — PATIENT INSTRUCTIONS
Please hold on to these instructions the  will call you within 1-9 business days when we receive authorization from your insurance. Echocardiogram    WHAT TO EXPECT:   ? This test will take approximately 45 minutes. ? It is an ultrasound of the heart. ? It can look at the valves and chambers inside the heart   ? There is no special instructions for this test.     If you are unable to keep this appointment, please notify us 24 hours prior to test at [phone]. Please hold on to these instructions the  will call you within 1-9 business days when we receive authorization from your insurance. Nuclear Stress Test    WHAT TO EXPECT:   ? You will need to confirm the test or it could be cancelled. ? This test will take approximately 2 hours: 1 hour in the AM &    1 hour in the PM. You will be given a time by the [facility] after the first  part is completed to come back. ? You will be given a medication, through an IV in the hand, this will safely simulate exercise. This IV is also needed to inject the radioactive isotope. ? You will receive an injection in the AM & PM before the pictures. ? Using a special camera, you will have one set of pictures of your heart taken in the AM and a set of pictures in the PM.     PREPARATION FOR TEST:  ? Eat a light breakfast such as juice and toast.  ? If you are DIABETIC: Eat a normal breakfast with NO CAFFEINE and take your insulin as normal.   ? AVOID ALL FOODS & DRINKS containing CAFFEINE 24 HOURS PRIOR TO THE TEST: Including coffee, Tea, Reva and other soft drinks even those labeled  caffeine free or decaffeinated.  ? HOLD THESE MEDICATIONS Persantine & Theophylline (Theodur)  24 hours prior & bring your inhaler with you. ?

## 2019-07-12 ENCOUNTER — OFFICE VISIT (OUTPATIENT)
Dept: FAMILY MEDICINE CLINIC | Age: 79
End: 2019-07-12
Payer: MEDICARE

## 2019-07-12 VITALS
HEART RATE: 67 BPM | SYSTOLIC BLOOD PRESSURE: 124 MMHG | BODY MASS INDEX: 33.08 KG/M2 | WEIGHT: 163.8 LBS | TEMPERATURE: 98.5 F | RESPIRATION RATE: 16 BRPM | DIASTOLIC BLOOD PRESSURE: 80 MMHG

## 2019-07-12 DIAGNOSIS — R39.9 UTI SYMPTOMS: ICD-10-CM

## 2019-07-12 DIAGNOSIS — R05.9 COUGH: ICD-10-CM

## 2019-07-12 DIAGNOSIS — N30.00 ACUTE CYSTITIS WITHOUT HEMATURIA: Primary | ICD-10-CM

## 2019-07-12 LAB
BILIRUBIN, POC: ABNORMAL
BLOOD URINE, POC: ABNORMAL
CLARITY, POC: ABNORMAL
COLOR, POC: ABNORMAL
GLUCOSE URINE, POC: NEGATIVE
KETONES, POC: NEGATIVE
LEUKOCYTE EST, POC: ABNORMAL
NITRITE, POC: POSITIVE
PH, POC: 5.5
PROTEIN, POC: ABNORMAL
SPECIFIC GRAVITY, POC: >=1.03
UROBILINOGEN, POC: ABNORMAL

## 2019-07-12 PROCEDURE — 1036F TOBACCO NON-USER: CPT | Performed by: NURSE PRACTITIONER

## 2019-07-12 PROCEDURE — 99213 OFFICE O/P EST LOW 20 MIN: CPT | Performed by: NURSE PRACTITIONER

## 2019-07-12 PROCEDURE — 81002 URINALYSIS NONAUTO W/O SCOPE: CPT | Performed by: NURSE PRACTITIONER

## 2019-07-12 PROCEDURE — G8427 DOCREV CUR MEDS BY ELIG CLIN: HCPCS | Performed by: NURSE PRACTITIONER

## 2019-07-12 PROCEDURE — 1090F PRES/ABSN URINE INCON ASSESS: CPT | Performed by: NURSE PRACTITIONER

## 2019-07-12 PROCEDURE — 4040F PNEUMOC VAC/ADMIN/RCVD: CPT | Performed by: NURSE PRACTITIONER

## 2019-07-12 PROCEDURE — G8417 CALC BMI ABV UP PARAM F/U: HCPCS | Performed by: NURSE PRACTITIONER

## 2019-07-12 PROCEDURE — G8399 PT W/DXA RESULTS DOCUMENT: HCPCS | Performed by: NURSE PRACTITIONER

## 2019-07-12 PROCEDURE — 1123F ACP DISCUSS/DSCN MKR DOCD: CPT | Performed by: NURSE PRACTITIONER

## 2019-07-12 RX ORDER — PHENAZOPYRIDINE HYDROCHLORIDE 100 MG/1
100 TABLET, FILM COATED ORAL 3 TIMES DAILY PRN
Qty: 9 TABLET | Refills: 0 | Status: SHIPPED | OUTPATIENT
Start: 2019-07-12 | End: 2019-07-15

## 2019-07-12 RX ORDER — CIPROFLOXACIN 500 MG/1
500 TABLET, FILM COATED ORAL 2 TIMES DAILY
Qty: 14 TABLET | Refills: 0 | Status: SHIPPED | OUTPATIENT
Start: 2019-07-12 | End: 2019-07-19

## 2019-07-12 RX ORDER — ALBUTEROL SULFATE 90 UG/1
2 AEROSOL, METERED RESPIRATORY (INHALATION) EVERY 6 HOURS PRN
Qty: 1 INHALER | Refills: 1 | Status: SHIPPED | OUTPATIENT
Start: 2019-07-12 | End: 2020-07-22

## 2019-07-12 ASSESSMENT — ENCOUNTER SYMPTOMS
NAUSEA: 0
RESPIRATORY NEGATIVE: 1
VOMITING: 0
DIARRHEA: 0

## 2019-07-14 LAB
ORGANISM: ABNORMAL
URINE CULTURE, ROUTINE: ABNORMAL
URINE CULTURE, ROUTINE: ABNORMAL

## 2019-09-20 ENCOUNTER — OFFICE VISIT (OUTPATIENT)
Dept: FAMILY MEDICINE CLINIC | Age: 79
End: 2019-09-20
Payer: MEDICARE

## 2019-09-20 VITALS
RESPIRATION RATE: 16 BRPM | HEART RATE: 100 BPM | DIASTOLIC BLOOD PRESSURE: 78 MMHG | SYSTOLIC BLOOD PRESSURE: 136 MMHG | WEIGHT: 163.2 LBS | BODY MASS INDEX: 32.96 KG/M2

## 2019-09-20 DIAGNOSIS — R30.0 DYSURIA: ICD-10-CM

## 2019-09-20 DIAGNOSIS — N30.01 ACUTE CYSTITIS WITH HEMATURIA: Primary | ICD-10-CM

## 2019-09-20 LAB
BILIRUBIN, POC: NEGATIVE
BLOOD URINE, POC: ABNORMAL
CLARITY, POC: ABNORMAL
COLOR, POC: YELLOW
GLUCOSE URINE, POC: NEGATIVE
KETONES, POC: NEGATIVE
LEUKOCYTE EST, POC: ABNORMAL
NITRITE, POC: POSITIVE
PH, POC: 5.5
PROTEIN, POC: 100
SPECIFIC GRAVITY, POC: 1.02
UROBILINOGEN, POC: 0.2

## 2019-09-20 PROCEDURE — 99213 OFFICE O/P EST LOW 20 MIN: CPT | Performed by: NURSE PRACTITIONER

## 2019-09-20 PROCEDURE — 81002 URINALYSIS NONAUTO W/O SCOPE: CPT | Performed by: NURSE PRACTITIONER

## 2019-09-20 PROCEDURE — 1123F ACP DISCUSS/DSCN MKR DOCD: CPT | Performed by: NURSE PRACTITIONER

## 2019-09-20 PROCEDURE — G8399 PT W/DXA RESULTS DOCUMENT: HCPCS | Performed by: NURSE PRACTITIONER

## 2019-09-20 PROCEDURE — G8427 DOCREV CUR MEDS BY ELIG CLIN: HCPCS | Performed by: NURSE PRACTITIONER

## 2019-09-20 PROCEDURE — 1090F PRES/ABSN URINE INCON ASSESS: CPT | Performed by: NURSE PRACTITIONER

## 2019-09-20 PROCEDURE — 1036F TOBACCO NON-USER: CPT | Performed by: NURSE PRACTITIONER

## 2019-09-20 PROCEDURE — 4040F PNEUMOC VAC/ADMIN/RCVD: CPT | Performed by: NURSE PRACTITIONER

## 2019-09-20 PROCEDURE — G8417 CALC BMI ABV UP PARAM F/U: HCPCS | Performed by: NURSE PRACTITIONER

## 2019-09-20 RX ORDER — PHENAZOPYRIDINE HYDROCHLORIDE 200 MG/1
200 TABLET, FILM COATED ORAL 3 TIMES DAILY
Qty: 9 TABLET | Refills: 0 | Status: SHIPPED | OUTPATIENT
Start: 2019-09-20 | End: 2019-09-23

## 2019-09-20 RX ORDER — PRAVASTATIN SODIUM 20 MG
TABLET ORAL
COMMUNITY
Start: 2019-08-23

## 2019-09-20 RX ORDER — SULFAMETHOXAZOLE AND TRIMETHOPRIM 800; 160 MG/1; MG/1
1 TABLET ORAL 2 TIMES DAILY
Qty: 6 TABLET | Refills: 0 | Status: SHIPPED | OUTPATIENT
Start: 2019-09-20 | End: 2019-09-23

## 2019-09-20 RX ORDER — OXYBUTYNIN CHLORIDE 5 MG/1
5 TABLET, EXTENDED RELEASE ORAL
COMMUNITY
Start: 2019-08-23

## 2019-09-20 RX ORDER — ALENDRONATE SODIUM 70 MG/1
70 TABLET ORAL
COMMUNITY
Start: 2019-06-13 | End: 2020-07-22

## 2019-09-20 ASSESSMENT — ENCOUNTER SYMPTOMS
NAUSEA: 0
ABDOMINAL PAIN: 1
DIARRHEA: 0
VOMITING: 0
CONSTIPATION: 0

## 2019-09-23 DIAGNOSIS — N30.01 ACUTE CYSTITIS WITH HEMATURIA: Primary | ICD-10-CM

## 2019-09-23 LAB
ORGANISM: ABNORMAL
URINE CULTURE, ROUTINE: ABNORMAL

## 2019-09-23 RX ORDER — LEVOFLOXACIN 750 MG/1
750 TABLET ORAL DAILY
Qty: 5 TABLET | Refills: 0 | Status: SHIPPED | OUTPATIENT
Start: 2019-09-23 | End: 2019-09-28

## 2019-10-18 ENCOUNTER — OFFICE VISIT (OUTPATIENT)
Dept: FAMILY MEDICINE CLINIC | Age: 79
End: 2019-10-18
Payer: MEDICARE

## 2019-10-18 VITALS
WEIGHT: 160.25 LBS | HEIGHT: 59 IN | BODY MASS INDEX: 32.31 KG/M2 | HEART RATE: 80 BPM | RESPIRATION RATE: 16 BRPM | DIASTOLIC BLOOD PRESSURE: 74 MMHG | SYSTOLIC BLOOD PRESSURE: 130 MMHG

## 2019-10-18 DIAGNOSIS — N39.0 CHRONIC UTI: ICD-10-CM

## 2019-10-18 DIAGNOSIS — R35.0 URINARY FREQUENCY: Primary | ICD-10-CM

## 2019-10-18 DIAGNOSIS — N30.01 ACUTE CYSTITIS WITH HEMATURIA: ICD-10-CM

## 2019-10-18 LAB
BILIRUBIN, POC: NEGATIVE
BLOOD URINE, POC: ABNORMAL
CLARITY, POC: ABNORMAL
COLOR, POC: YELLOW
GLUCOSE URINE, POC: NEGATIVE
KETONES, POC: NEGATIVE
LEUKOCYTE EST, POC: ABNORMAL
NITRITE, POC: POSITIVE
PH, POC: 6
PROTEIN, POC: 100
SPECIFIC GRAVITY, POC: 1.02
UROBILINOGEN, POC: 0.2

## 2019-10-18 PROCEDURE — 1123F ACP DISCUSS/DSCN MKR DOCD: CPT | Performed by: NURSE PRACTITIONER

## 2019-10-18 PROCEDURE — 99213 OFFICE O/P EST LOW 20 MIN: CPT | Performed by: NURSE PRACTITIONER

## 2019-10-18 PROCEDURE — G8417 CALC BMI ABV UP PARAM F/U: HCPCS | Performed by: NURSE PRACTITIONER

## 2019-10-18 PROCEDURE — G8399 PT W/DXA RESULTS DOCUMENT: HCPCS | Performed by: NURSE PRACTITIONER

## 2019-10-18 PROCEDURE — 4040F PNEUMOC VAC/ADMIN/RCVD: CPT | Performed by: NURSE PRACTITIONER

## 2019-10-18 PROCEDURE — 1036F TOBACCO NON-USER: CPT | Performed by: NURSE PRACTITIONER

## 2019-10-18 PROCEDURE — 1090F PRES/ABSN URINE INCON ASSESS: CPT | Performed by: NURSE PRACTITIONER

## 2019-10-18 PROCEDURE — 81002 URINALYSIS NONAUTO W/O SCOPE: CPT | Performed by: NURSE PRACTITIONER

## 2019-10-18 PROCEDURE — G8484 FLU IMMUNIZE NO ADMIN: HCPCS | Performed by: NURSE PRACTITIONER

## 2019-10-18 PROCEDURE — G8427 DOCREV CUR MEDS BY ELIG CLIN: HCPCS | Performed by: NURSE PRACTITIONER

## 2019-10-18 RX ORDER — NITROFURANTOIN 25; 75 MG/1; MG/1
100 CAPSULE ORAL 2 TIMES DAILY
Qty: 20 CAPSULE | Refills: 0 | Status: SHIPPED | OUTPATIENT
Start: 2019-10-18 | End: 2019-10-28

## 2019-10-18 RX ORDER — NITROFURANTOIN 25; 75 MG/1; MG/1
100 CAPSULE ORAL DAILY
Qty: 30 CAPSULE | Refills: 1 | Status: SHIPPED | OUTPATIENT
Start: 2019-10-18 | End: 2019-11-19 | Stop reason: DRUGHIGH

## 2019-10-18 RX ORDER — PHENAZOPYRIDINE HYDROCHLORIDE 200 MG/1
200 TABLET, FILM COATED ORAL 3 TIMES DAILY PRN
Qty: 9 TABLET | Refills: 0 | Status: SHIPPED | OUTPATIENT
Start: 2019-10-18 | End: 2019-10-21

## 2019-10-18 ASSESSMENT — ENCOUNTER SYMPTOMS
ABDOMINAL PAIN: 1
CONSTIPATION: 0
NAUSEA: 0
DIARRHEA: 0
VOMITING: 0

## 2019-10-20 LAB
ORGANISM: ABNORMAL
URINE CULTURE, ROUTINE: ABNORMAL

## 2019-11-19 ENCOUNTER — OFFICE VISIT (OUTPATIENT)
Dept: FAMILY MEDICINE CLINIC | Age: 79
End: 2019-11-19
Payer: MEDICARE

## 2019-11-19 VITALS
WEIGHT: 159.5 LBS | RESPIRATION RATE: 16 BRPM | DIASTOLIC BLOOD PRESSURE: 80 MMHG | HEART RATE: 74 BPM | HEIGHT: 59 IN | SYSTOLIC BLOOD PRESSURE: 124 MMHG | BODY MASS INDEX: 32.16 KG/M2

## 2019-11-19 DIAGNOSIS — Z23 NEED FOR PROPHYLACTIC VACCINATION AGAINST DIPHTHERIA-TETANUS-PERTUSSIS (DTP): ICD-10-CM

## 2019-11-19 DIAGNOSIS — N39.0 CHRONIC UTI: Primary | ICD-10-CM

## 2019-11-19 DIAGNOSIS — J31.0 CHRONIC RHINITIS: ICD-10-CM

## 2019-11-19 LAB
BILIRUBIN, POC: NEGATIVE
BLOOD URINE, POC: NEGATIVE
CLARITY, POC: CLEAR
COLOR, POC: YELLOW
GLUCOSE URINE, POC: NEGATIVE
KETONES, POC: NEGATIVE
LEUKOCYTE EST, POC: NEGATIVE
NITRITE, POC: NEGATIVE
PH, POC: 7
PROTEIN, POC: NEGATIVE
SPECIFIC GRAVITY, POC: 1.02
UROBILINOGEN, POC: 0.2

## 2019-11-19 PROCEDURE — G8482 FLU IMMUNIZE ORDER/ADMIN: HCPCS | Performed by: NURSE PRACTITIONER

## 2019-11-19 PROCEDURE — G0008 ADMIN INFLUENZA VIRUS VAC: HCPCS | Performed by: NURSE PRACTITIONER

## 2019-11-19 PROCEDURE — 1036F TOBACCO NON-USER: CPT | Performed by: NURSE PRACTITIONER

## 2019-11-19 PROCEDURE — 90686 IIV4 VACC NO PRSV 0.5 ML IM: CPT | Performed by: NURSE PRACTITIONER

## 2019-11-19 PROCEDURE — 4040F PNEUMOC VAC/ADMIN/RCVD: CPT | Performed by: NURSE PRACTITIONER

## 2019-11-19 PROCEDURE — 1123F ACP DISCUSS/DSCN MKR DOCD: CPT | Performed by: NURSE PRACTITIONER

## 2019-11-19 PROCEDURE — G8417 CALC BMI ABV UP PARAM F/U: HCPCS | Performed by: NURSE PRACTITIONER

## 2019-11-19 PROCEDURE — 81002 URINALYSIS NONAUTO W/O SCOPE: CPT | Performed by: NURSE PRACTITIONER

## 2019-11-19 PROCEDURE — 90715 TDAP VACCINE 7 YRS/> IM: CPT | Performed by: NURSE PRACTITIONER

## 2019-11-19 PROCEDURE — G8427 DOCREV CUR MEDS BY ELIG CLIN: HCPCS | Performed by: NURSE PRACTITIONER

## 2019-11-19 PROCEDURE — G8399 PT W/DXA RESULTS DOCUMENT: HCPCS | Performed by: NURSE PRACTITIONER

## 2019-11-19 PROCEDURE — 1090F PRES/ABSN URINE INCON ASSESS: CPT | Performed by: NURSE PRACTITIONER

## 2019-11-19 PROCEDURE — 90471 IMMUNIZATION ADMIN: CPT | Performed by: NURSE PRACTITIONER

## 2019-11-19 PROCEDURE — 99213 OFFICE O/P EST LOW 20 MIN: CPT | Performed by: NURSE PRACTITIONER

## 2019-11-19 RX ORDER — FLUTICASONE PROPIONATE 50 MCG
1 SPRAY, SUSPENSION (ML) NASAL DAILY
Qty: 2 BOTTLE | Refills: 5 | Status: SHIPPED | OUTPATIENT
Start: 2019-11-19

## 2019-11-19 RX ORDER — NITROFURANTOIN MACROCRYSTALS 50 MG/1
50 CAPSULE ORAL NIGHTLY
Qty: 30 CAPSULE | Refills: 11 | Status: SHIPPED | OUTPATIENT
Start: 2019-11-19 | End: 2020-07-22

## 2019-11-19 ASSESSMENT — ENCOUNTER SYMPTOMS
RESPIRATORY NEGATIVE: 1
GASTROINTESTINAL NEGATIVE: 1

## 2019-11-19 ASSESSMENT — PATIENT HEALTH QUESTIONNAIRE - PHQ9
SUM OF ALL RESPONSES TO PHQ QUESTIONS 1-9: 1
2. FEELING DOWN, DEPRESSED OR HOPELESS: 1
SUM OF ALL RESPONSES TO PHQ9 QUESTIONS 1 & 2: 1
SUM OF ALL RESPONSES TO PHQ QUESTIONS 1-9: 1
1. LITTLE INTEREST OR PLEASURE IN DOING THINGS: 0

## 2020-07-17 ENCOUNTER — HOSPITAL ENCOUNTER (OUTPATIENT)
Age: 80
Discharge: HOME | End: 2020-07-17
Payer: MEDICARE

## 2020-07-17 LAB
ALBUMIN SERPL-MCNC: 4.2 GM/DL (ref 3.4–5)
ALP BLD-CCNC: 125 IU/L (ref 40–129)
ALT SERPL-CCNC: 19 U/L (ref 10–40)
ANION GAP SERPL CALCULATED.3IONS-SCNC: 11 MMOL/L (ref 4–16)
AST SERPL-CCNC: 31 IU/L (ref 15–37)
BASOPHILS ABSOLUTE: 0 K/CU MM
BASOPHILS RELATIVE PERCENT: 0.5 % (ref 0–1)
BILIRUB SERPL-MCNC: 0.6 MG/DL (ref 0–1)
BUN BLDV-MCNC: 15 MG/DL (ref 6–23)
CALCIUM SERPL-MCNC: 9.9 MG/DL (ref 8.3–10.6)
CHLORIDE BLD-SCNC: 104 MMOL/L (ref 99–110)
CO2: 27 MMOL/L (ref 21–32)
CREAT SERPL-MCNC: 0.7 MG/DL (ref 0.6–1.1)
DIFFERENTIAL TYPE: ABNORMAL
EOSINOPHILS ABSOLUTE: 0.1 K/CU MM
EOSINOPHILS RELATIVE PERCENT: 0.9 % (ref 0–3)
GFR AFRICAN AMERICAN: >60 ML/MIN/1.73M2
GFR NON-AFRICAN AMERICAN: >60 ML/MIN/1.73M2
GLUCOSE BLD-MCNC: 121 MG/DL (ref 70–99)
HCT VFR BLD CALC: 47.9 % (ref 37–47)
HEMOGLOBIN: 15.6 GM/DL (ref 12.5–16)
IMMATURE NEUTROPHIL %: 0.2 % (ref 0–0.43)
LYMPHOCYTES ABSOLUTE: 2.6 K/CU MM
LYMPHOCYTES RELATIVE PERCENT: 32 % (ref 24–44)
MCH RBC QN AUTO: 29.7 PG (ref 27–31)
MCHC RBC AUTO-ENTMCNC: 32.6 % (ref 32–36)
MCV RBC AUTO: 91.1 FL (ref 78–100)
MONOCYTES ABSOLUTE: 0.7 K/CU MM
MONOCYTES RELATIVE PERCENT: 8.5 % (ref 0–4)
PDW BLD-RTO: 13.6 % (ref 11.7–14.9)
PLATELET # BLD: 201 K/CU MM (ref 140–440)
PMV BLD AUTO: 11.2 FL (ref 7.5–11.1)
POTASSIUM SERPL-SCNC: 4.1 MMOL/L (ref 3.5–5.1)
RBC # BLD: 5.26 M/CU MM (ref 4.2–5.4)
SEGMENTED NEUTROPHILS ABSOLUTE COUNT: 4.7 K/CU MM
SEGMENTED NEUTROPHILS RELATIVE PERCENT: 57.9 % (ref 36–66)
SODIUM BLD-SCNC: 142 MMOL/L (ref 135–145)
TOTAL IMMATURE NEUTOROPHIL: 0.02 K/CU MM
TOTAL PROTEIN: 7.5 GM/DL (ref 6.4–8.2)
WBC # BLD: 8.1 K/CU MM (ref 4–10.5)

## 2020-07-17 PROCEDURE — U0002 COVID-19 LAB TEST NON-CDC: HCPCS

## 2020-07-17 PROCEDURE — 85025 COMPLETE CBC W/AUTO DIFF WBC: CPT

## 2020-07-17 PROCEDURE — 36415 COLL VENOUS BLD VENIPUNCTURE: CPT

## 2020-07-17 PROCEDURE — 80053 COMPREHEN METABOLIC PANEL: CPT

## 2020-07-22 RX ORDER — ESTRADIOL 0.1 MG/G
2 CREAM VAGINAL DAILY
COMMUNITY

## 2020-07-22 NOTE — PROGRESS NOTES
Spoke with pt. Via telephone. Pt. will arrive at the ER entrance at 0800 on 07/24/2020. Pt.informed that they may have one visitor come with them. The visitor must be free of covid symptoms. Both of them must wear a mask upon entering the hospital.  If they do not have a mask, one will be given to them at the . The masks must be worn the whole time they are in the building. The visitor must stay in the assigned room in [facility]. The visitor may not go to the vending machines, cafeteria, or walk around in the hospital. Pt. Will be NPO after MN tonight, including no gum, candy, or nicotine products. Pt. will take acid reflux medication with a tiny sip of water the morning of the procedure. If the patient uses inhalers or cpap, they will bring them with them to the hospital.  Pt. will shower with soap and water and will not use any lotions, creams, ointments on their skin. Pt. will wear no jewelry or metal. Covid-19 test was completed on 7/17/2020  and results are pending. Pt. Has been self-quarantining since their Covid-19 testing. Pt. Has had no cough, sore throat, fever, or any other unusual s/s that the physician should be made aware of before surgery. Pt. Had no further questions and/or concerns at this time. SDS phone number was given should any further questions arise. [phone].

## 2020-07-23 ENCOUNTER — ANESTHESIA EVENT (OUTPATIENT)
Dept: OPERATING ROOM | Age: 80
End: 2020-07-23
Payer: MEDICARE

## 2020-07-23 LAB
SARS-COV-2: NOT DETECTED
SOURCE: NORMAL

## 2020-07-23 NOTE — ANESTHESIA PRE PROCEDURE
Department of Anesthesiology  Preprocedure Note       Name:  Gregg Babcock   Age:  78 y.o.  :  1940                                          MRN:  [id number]         Date:  2020      Surgeon: Reta Rasheed):  Jimenez Kumar DPM    Procedure: Procedure(s):  FOOT LESION BIOPSY EXCISION TISSUE MASS LEFT FOOT    Medications prior to admission:   Prior to Admission medications    Medication Sig Start Date End Date Taking? Authorizing Provider   estradiol (ESTRACE) 0.1 MG/GM vaginal cream Place 2 g vaginally daily   Yes Historical Provider, MD   fluticasone (FLONASE) 50 MCG/ACT nasal spray 1 spray by Each Nostril route daily 19  Yes Therman Skiff Swank, APRN - CNP   pravastatin (PRAVACHOL) 20 MG tablet  19  Yes Historical Provider, MD   alendronate (FOSAMAX) 70 MG tablet Take 70 mg by mouth 19 Yes Historical Provider, MD   oxybutynin (DITROPAN-XL) 5 MG extended release tablet Take 5 mg by mouth 19  Yes Historical Provider, MD   loratadine (CLARITIN) 10 MG tablet Take 10 mg by mouth daily   Yes Historical Provider, MD   omeprazole (PRILOSEC) 40 MG delayed release capsule Take 40 mg by mouth as needed   Yes Historical Provider, MD       Current medications:    No current facility-administered medications for this encounter.       Current Outpatient Medications   Medication Sig Dispense Refill    estradiol (ESTRACE) 0.1 MG/GM vaginal cream Place 2 g vaginally daily      fluticasone (FLONASE) 50 MCG/ACT nasal spray 1 spray by Each Nostril route daily 2 Bottle 5    pravastatin (PRAVACHOL) 20 MG tablet       alendronate (FOSAMAX) 70 MG tablet Take 70 mg by mouth      oxybutynin (DITROPAN-XL) 5 MG extended release tablet Take 5 mg by mouth      loratadine (CLARITIN) 10 MG tablet Take 10 mg by mouth daily      omeprazole (PRILOSEC) 40 MG delayed release capsule Take 40 mg by mouth as needed         Allergies:  No Known Allergies    Problem List:    Patient Active Problem List   Diagnosis Code    Disorder of rotator cuff M67.919    LLQ abdominal pain R10.32    Precordial pain R07.2    Dyslipidemia E78.5    Murmur R01.1    Chronic rhinitis J31.0       Past Medical History:        Diagnosis Date    Acid reflux     Arthritis     \"Hands, Shoulders\"    Bladder prolapse, female, acquired     Bronchitis Last Episode 12-16    [facility] [address]     Emphysema of lung [facility] [address]     No Pulmonologist At This Time    [place] (hard of hearing)     Bilateral Ears    Hyperlipidemia     \"Borderline\"    Hypertension     \"Borderline\"    Kidney stone     Retention of urine     Tooth missing     Lower Left    Urinary incontinence     UTI (urinary tract infection) In Past    No Current Symptoms    Wears glasses        Past Surgical History:        Procedure Laterality Date    BLADDER SURGERY  Last Done In     \"3 Bladder Lifts\"    BLADDER SURGERY      last surgery     COLONOSCOPY  's    COLONOSCOPY  2017    pan divertics, int hem    CYST REMOVAL Right 02/15/2017    right wrist volar ganglion cyst excision    CYSTOSCOPY  's    Kidney Stone    DENTAL SURGERY      Tooth Extracted In Past    DILATION AND CURETTAGE OF UTERUS      HYSTERECTOMY, TOTAL ABDOMINAL  Late 's    ROTATOR CUFF REPAIR Right        Social History:    Social History     Tobacco Use    Smoking status: Former Smoker     Packs/day: 0.50     Years: 29.00     Pack years: 14.50     Types: Cigarettes     Start date:      Last attempt to quit:      Years since quittin.5    Smokeless tobacco: Never Used   Substance Use Topics    Alcohol use:  No                                Counseling given: Not Answered      Vital Signs (Current):   Vitals:    20 1419   Weight: 152 lb (68.9 kg)   Height: 5' (1.524 m)                                              BP Readings from Last 3 Encounters:   19 124/80   10/18/19 130/74   19 136/78       NPO Status: BMI:   Wt Readings from Last 3 Encounters:   19 159 lb 8 oz (72.3 kg)   10/18/19 160 lb 4 oz (72.7 kg)   19 163 lb 3.2 oz (74 kg)     Body mass index is 29.69 kg/m². CBC:   Lab Results   Component Value Date    WBC 8.1 2020    RBC 5.26 2020    HGB 15.6 2020    HCT 47.9 2020    MCV 91.1 2020    RDW 13.6 2020     2020       CMP:   Lab Results   Component Value Date     2020    K 4.1 2020     2020    CO2 27 2020    BUN 15 2020    CREATININE 0.7 2020    GFRAA >60 2020    LABGLOM >60 2020    GLUCOSE 121 2020    PROT 7.5 2020    CALCIUM 9.9 2020    BILITOT 0.6 2020    ALKPHOS 125 2020    AST 31 2020    ALT 19 2020       POC Tests: No results for input(s): POCGLU, POCNA, POCK, POCCL, POCBUN, POCHEMO, POCHCT in the last 72 hours. Coags: No results found for: PROTIME, INR, APTT    HCG (If Applicable): No results found for: PREGTESTUR, PREGSERUM, HCG, HCGQUANT     ABGs: No results found for: PHART, PO2ART, CAL8KYE, RAJ7REH, BEART, W5FZNBYU     Type & Screen (If Applicable):  No results found for: LABABO, LABRH    Drug/Infectious Status (If Applicable):  No results found for: HIV, HEPCAB    COVID-19 Screening (If Applicable):   Lab Results   Component Value Date    COVID19 NOT DETECTED 2020         Anesthesia Evaluation    Airway:         Dental:          Pulmonary:   (+) COPD:                            ROS comment: Former Smoker - 14.5 pack years   Quit Smokin90        Cardiovascular:    (+) hypertension:, hyperlipidemia         Beta Blocker:  Not on Beta Blocker         Neuro/Psych:               GI/Hepatic/Renal:   (+) GERD:,           Endo/Other: Negative Endo/Other ROS                    Abdominal:           Vascular: negative vascular ROS. Anesthesia Plan      general     ASA 3       Induction: intravenous. Ora Fabry, APRN - LOVELY   7/23/2020         Pre Anesthesia Assessment complete.  Chart reviewed on 7/23/2020

## 2020-07-24 ENCOUNTER — HOSPITAL ENCOUNTER (OUTPATIENT)
Age: 80
Setting detail: OUTPATIENT SURGERY
Discharge: HOME | End: 2020-07-24
Attending: PODIATRIST | Admitting: PODIATRIST
Payer: MEDICARE

## 2020-07-24 ENCOUNTER — ANESTHESIA (OUTPATIENT)
Dept: OPERATING ROOM | Age: 80
End: 2020-07-24
Payer: MEDICARE

## 2020-07-24 VITALS
DIASTOLIC BLOOD PRESSURE: 75 MMHG | WEIGHT: 146 LBS | OXYGEN SATURATION: 95 % | BODY MASS INDEX: 28.66 KG/M2 | HEART RATE: 79 BPM | RESPIRATION RATE: 16 BRPM | SYSTOLIC BLOOD PRESSURE: 155 MMHG | TEMPERATURE: 97.2 F | HEIGHT: 60 IN

## 2020-07-24 PROCEDURE — 7100000010 HC PHASE II RECOVERY - FIRST 15 MIN: Performed by: PODIATRIST

## 2020-07-24 PROCEDURE — 3600000002 HC SURGERY LEVEL 2 BASE: Performed by: PODIATRIST

## 2020-07-24 PROCEDURE — 3600000012 HC SURGERY LEVEL 2 ADDTL 15MIN: Performed by: PODIATRIST

## 2020-07-24 PROCEDURE — 2580000003 HC RX 258: Performed by: PODIATRIST

## 2020-07-24 PROCEDURE — 6370000000 HC RX 637 (ALT 250 FOR IP): Performed by: PODIATRIST

## 2020-07-24 PROCEDURE — 2709999900 HC NON-CHARGEABLE SUPPLY: Performed by: PODIATRIST

## 2020-07-24 PROCEDURE — 2500000003 HC RX 250 WO HCPCS: Performed by: PODIATRIST

## 2020-07-24 PROCEDURE — 88307 TISSUE EXAM BY PATHOLOGIST: CPT | Performed by: PATHOLOGY

## 2020-07-24 PROCEDURE — 6360000002 HC RX W HCPCS: Performed by: PODIATRIST

## 2020-07-24 RX ORDER — DIAPER,BRIEF,INFANT-TODD,DISP
EACH MISCELLANEOUS
Status: COMPLETED | OUTPATIENT
Start: 2020-07-24 | End: 2020-07-24

## 2020-07-24 RX ADMIN — CEFAZOLIN 1 G: 1 INJECTION, POWDER, FOR SOLUTION INTRAMUSCULAR; INTRAVENOUS at 10:20

## 2020-07-24 ASSESSMENT — PAIN DESCRIPTION - DESCRIPTORS: DESCRIPTORS: ACHING

## 2020-07-24 ASSESSMENT — PAIN - FUNCTIONAL ASSESSMENT: PAIN_FUNCTIONAL_ASSESSMENT: 0-10

## 2020-07-24 NOTE — PROGRESS NOTES
Patient discharge instructions and prescription reviewed and verified. RN reviewed d/c instructions with patient and friend. Post Op shoe given. All questions answered. Prescription given to patient. Discharge paperwork signed by RN and patients friend. Armband removed.

## 2020-07-24 NOTE — ANESTHESIA PRE PROCEDURE
Department of Anesthesiology  Preprocedure Note       Name:  Charles Massey   Age:  78 y.o.  :  1940                                          MRN:  [id number]         Date:  2020      Surgeon: So Lawrence):  Gavin Hernandez DPM    Procedure: Procedure(s):  FOOT LESION BIOPSY EXCISION TISSUE MASS LEFT FOOT    Medications prior to admission:   Prior to Admission medications    Medication Sig Start Date End Date Taking? Authorizing Provider   estradiol (ESTRACE) 0.1 MG/GM vaginal cream Place 2 g vaginally daily    Historical Provider, MD   fluticasone (FLONASE) 50 MCG/ACT nasal spray 1 spray by Each Nostril route daily 19   SONIA Colby CNP   pravastatin (PRAVACHOL) 20 MG tablet  19   Historical Provider, MD   alendronate (FOSAMAX) 70 MG tablet Take 70 mg by mouth 19  Historical Provider, MD   oxybutynin (DITROPAN-XL) 5 MG extended release tablet Take 5 mg by mouth 19   Historical Provider, MD   loratadine (CLARITIN) 10 MG tablet Take 10 mg by mouth daily    Historical Provider, MD   omeprazole (PRILOSEC) 40 MG delayed release capsule Take 40 mg by mouth as needed    Historical Provider, MD       Current medications:    No current outpatient medications on file. No current facility-administered medications for this visit.         Allergies:  No Known Allergies    Problem List:    Patient Active Problem List   Diagnosis Code    Disorder of rotator cuff M67.919    LLQ abdominal pain R10.32    Precordial pain R07.2    Dyslipidemia E78.5    Murmur R01.1    Chronic rhinitis J31.0       Past Medical History:        Diagnosis Date    Acid reflux     Arthritis     \"Hands, Shoulders\"    Bladder prolapse, female, acquired     Bronchitis Last Episode 12-16    [facility] [address]     Emphysema of lung ([facility] [address]     No Pulmonologist At This Time    [place] (hard of hearing)     Bilateral Ears    Hyperlipidemia     \"Borderline\"    Hypertension \"Borderline\"    Kidney stone     Retention of urine     Tooth missing     Lower Left    Urinary incontinence     UTI (urinary tract infection) In Past    No Current Symptoms    Wears glasses        Past Surgical History:        Procedure Laterality Date    BLADDER SURGERY  Last Done In     \"3 Bladder Lifts\"    BLADDER SURGERY      last surgery     COLONOSCOPY  's    COLONOSCOPY  2017    pan divertics, int hem    CYST REMOVAL Right 02/15/2017    right wrist volar ganglion cyst excision    CYSTOSCOPY      Kidney Stone    DENTAL SURGERY      Tooth Extracted In Past    DILATION AND CURETTAGE OF UTERUS      HYSTERECTOMY, TOTAL ABDOMINAL  Late     ROTATOR CUFF REPAIR Right        Social History:    Social History     Tobacco Use    Smoking status: Former Smoker     Packs/day: 0.50     Years: 29.00     Pack years: 14.50     Types: Cigarettes     Start date:      Last attempt to quit:      Years since quittin.    Smokeless tobacco: Never Used   Substance Use Topics    Alcohol use: No                                Counseling given: Not Answered      Vital Signs (Current): There were no vitals filed for this visit.                                            BP Readings from Last 3 Encounters:   20 (!) 159/78   19 124/80   10/18/19 130/74       NPO Status:                                                                                 BMI:   Wt Readings from Last 3 Encounters:   20 146 lb (66.2 kg)   19 159 lb 8 oz (72.3 kg)   10/18/19 160 lb 4 oz (72.7 kg)     There is no height or weight on file to calculate BMI.    CBC:   Lab Results   Component Value Date    WBC 8.1 2020    RBC 5.26 2020    HGB 15.6 2020    HCT 47.9 2020    MCV 91.1 2020    RDW 13.6 2020     2020       CMP:   Lab Results   Component Value Date     2020    K 4.1 2020     2020

## 2020-07-25 NOTE — OP NOTE
[address] [place]                                OPERATIVE REPORT    PATIENT NAME: Samir Unger                   :        1940  MED REC NO:   [id number]                          ROOM:  ACCOUNT NO:   [de-identified]                           ADMIT DATE: 2020  PROVIDER:     Sonu Moe DPM    DATE OF PROCEDURE:  2020    SURGEON:  Sonu Moe DPM    ANESTHESIA:  Local.    PREOPERATIVE DIAGNOSIS:  Soft tissue mass, plantar left foot. POSTOPERATIVE DIAGNOSIS:  Soft tissue mass, plantar left foot. PROCEDURE:  Excision of soft tissue mass, plantar left foot. COMPLICATIONS:  None. ESTIMATED BLOOD LOSS:  Less than 3 mL. OPERATIVE PROCEDURE:  The patient was brought to the OR, laid on the  table in supine position. Local anesthesia consisting of 2 mL of a 1:1  mix of 1% lidocaine plain and 0.25% Marcaine plain was administered in a  field block fashion about the painful soft tissue mass located at the  plantar distal heel, left foot. The left foot and ankle were prepped  and draped in the usual aseptic manner. The left foot was elevated and  exsanguinated prior to inflation of ankle tourniquet to 250 mmHg  pressure. The leg was then lowered on to the operating room table. Attention was directed to the soft tissue mass which could be palpated  at the distal plantar heel, left foot. A 3-cm transverse incision was  made directly over the soft tissue mass. The incision was deepened  using both blunt and sharp dissections. Care was taken to ligate all  bleeders and to retract all neurovascular structures. At this time, a  soft tissue mass about the size of a small pea was noted in the  operative field. The soft tissue mass looked like adipose tissue in  nature. The mass was excised in toto. No stalks were noted attached to  the mass. The area was flushed with sterile saline.   No other masses  were

## 2021-06-30 ENCOUNTER — HOSPITAL ENCOUNTER (OUTPATIENT)
Dept: WOMENS IMAGING | Age: 81
Discharge: HOME | End: 2021-06-30
Payer: MEDICARE

## 2021-06-30 DIAGNOSIS — M81.0 AGE-RELATED OSTEOPOROSIS WITHOUT CURRENT PATHOLOGICAL FRACTURE: ICD-10-CM

## 2021-06-30 DIAGNOSIS — Z12.31 ENCOUNTER FOR SCREENING MAMMOGRAM FOR MALIGNANT NEOPLASM OF BREAST: ICD-10-CM

## 2021-06-30 PROCEDURE — 77063 BREAST TOMOSYNTHESIS BI: CPT

## 2021-06-30 PROCEDURE — 77080 DXA BONE DENSITY AXIAL: CPT

## 2021-10-02 ENCOUNTER — APPOINTMENT (OUTPATIENT)
Dept: GENERAL RADIOLOGY | Age: 81
End: 2021-10-02
Payer: MEDICARE

## 2021-10-02 ENCOUNTER — HOSPITAL ENCOUNTER (EMERGENCY)
Age: 81
Discharge: HOME | End: 2021-10-02
Attending: EMERGENCY MEDICINE
Payer: MEDICARE

## 2021-10-02 VITALS
OXYGEN SATURATION: 95 % | SYSTOLIC BLOOD PRESSURE: 144 MMHG | HEART RATE: 81 BPM | TEMPERATURE: 98 F | WEIGHT: 151 LBS | RESPIRATION RATE: 18 BRPM | BODY MASS INDEX: 29.49 KG/M2 | DIASTOLIC BLOOD PRESSURE: 79 MMHG

## 2021-10-02 DIAGNOSIS — N30.00 ACUTE CYSTITIS WITHOUT HEMATURIA: Primary | ICD-10-CM

## 2021-10-02 DIAGNOSIS — S66.912A STRAIN OF LEFT WRIST, INITIAL ENCOUNTER: ICD-10-CM

## 2021-10-02 LAB
BACTERIA: ABNORMAL /HPF
BILIRUBIN URINE: ABNORMAL MG/DL
BLOOD, URINE: ABNORMAL
CAST TYPE: NEGATIVE /HPF
CLARITY: ABNORMAL
COLOR: ABNORMAL
COMMENT UA: ABNORMAL
CRYSTAL TYPE: ABNORMAL /HPF
EPITHELIAL CELLS, UA: ABNORMAL /HPF
GLUCOSE, URINE: ABNORMAL MG/DL
KETONES, URINE: ABNORMAL MG/DL
LEUKOCYTE ESTERASE, URINE: ABNORMAL
NITRITE URINE, QUANTITATIVE: POSITIVE
PH, URINE: 5 (ref 5–8)
PROTEIN UA: ABNORMAL MG/DL
RBC URINE: ABNORMAL /HPF (ref 0–6)
SPECIFIC GRAVITY UA: 1.03 (ref 1–1.03)
UROBILINOGEN, URINE: 8 MG/DL (ref 0.2–1)
WBC UA: ABNORMAL /HPF (ref 0–5)

## 2021-10-02 PROCEDURE — 73110 X-RAY EXAM OF WRIST: CPT

## 2021-10-02 PROCEDURE — 87086 URINE CULTURE/COLONY COUNT: CPT

## 2021-10-02 PROCEDURE — 99285 EMERGENCY DEPT VISIT HI MDM: CPT

## 2021-10-02 PROCEDURE — 87077 CULTURE AEROBIC IDENTIFY: CPT

## 2021-10-02 PROCEDURE — 6370000000 HC RX 637 (ALT 250 FOR IP): Performed by: EMERGENCY MEDICINE

## 2021-10-02 PROCEDURE — 81001 URINALYSIS AUTO W/SCOPE: CPT

## 2021-10-02 PROCEDURE — 87186 SC STD MICRODIL/AGAR DIL: CPT

## 2021-10-02 RX ORDER — NITROFURANTOIN 25; 75 MG/1; MG/1
100 CAPSULE ORAL 2 TIMES DAILY
Qty: 10 CAPSULE | Refills: 0 | Status: SHIPPED | OUTPATIENT
Start: 2021-10-02 | End: 2021-10-07

## 2021-10-02 RX ORDER — NITROFURANTOIN 25; 75 MG/1; MG/1
100 CAPSULE ORAL ONCE
Status: COMPLETED | OUTPATIENT
Start: 2021-10-02 | End: 2021-10-02

## 2021-10-02 RX ORDER — ACETAMINOPHEN 500 MG
1000 TABLET ORAL ONCE
Status: COMPLETED | OUTPATIENT
Start: 2021-10-02 | End: 2021-10-02

## 2021-10-02 RX ADMIN — NITROFURANTOIN MONOHYDRATE/MACROCRYSTALLINE 100 MG: 25; 75 CAPSULE ORAL at 16:48

## 2021-10-02 RX ADMIN — ACETAMINOPHEN 1000 MG: 500 TABLET ORAL at 16:48

## 2021-10-02 ASSESSMENT — PAIN DESCRIPTION - ORIENTATION: ORIENTATION: LEFT

## 2021-10-02 ASSESSMENT — PAIN DESCRIPTION - LOCATION: LOCATION: WRIST

## 2021-10-02 ASSESSMENT — PAIN DESCRIPTION - PAIN TYPE: TYPE: ACUTE PAIN

## 2021-10-02 ASSESSMENT — PAIN SCALES - GENERAL
PAINLEVEL_OUTOF10: 9
PAINLEVEL_OUTOF10: 9

## 2021-10-02 ASSESSMENT — PAIN DESCRIPTION - DESCRIPTORS: DESCRIPTORS: SHOOTING;SHARP

## 2021-10-02 NOTE — ED PROVIDER NOTES
 BLADDER SURGERY      last surgery     COLONOSCOPY  26's    COLONOSCOPY  2017    pan divertics, int hem    CYST REMOVAL Right 02/15/2017    right wrist volar ganglion cyst excision    CYSTOSCOPY      Kidney Stone    DENTAL SURGERY      Tooth Extracted In Past    DILATION AND CURETTAGE OF UTERUS      FOOT SURGERY Left 2020    EXCISION OF SOFT TISSUE MASS LEFT PLANTAR FOOT performed by Melissa Laureano DPM at [address] [facility], TOTAL ABDOMINAL  Late 's    OVARY REMOVAL      age 40    [address] Right      Family History   Problem Relation Age of Onset    Cancer Mother         Cancer Unsure What Kind    Early Death Mother 52        Cancer Unsure What Kind    Cancer Brother         Cancer Of The Spine    Early Death Brother 46        Cancer Of The Spine    Heart Disease Brother         Heart Attack    Heart Disease Brother         Heart Attack    Kidney Disease Brother     Kidney Disease Brother         Kidney Stones    Heart Disease Brother         Heart Attack, Heart Stents    Early Death Father 58        Emphysema    Emphysema Father     Heart Disease Father     Diabetes Sister     Heart Disease Brother     Kidney Disease Brother     Cancer Brother         Lymphoma    Other Brother         \"Took Part Of His Colon Out\"     Social History     Socioeconomic History    Marital status:      Spouse name: Not on file    Number of children: Not on file    Years of education: Not on file    Highest education level: Not on file   Occupational History    Not on file   Tobacco Use    Smoking status: Former Smoker     Packs/day: 0.50     Years: 29.00     Pack years: 14.50     Types: Cigarettes     Start date:      Quit date:      Years since quittin.7    Smokeless tobacco: Never Used   Vaping Use    Vaping Use: Never used   Substance and Sexual Activity    Alcohol use: No    Drug use: No    Sexual activity: Never   Other Topics Concern    Not on file   Social History Narrative    Not on file     Social Determinants of Health     Financial Resource Strain:     Difficulty of Paying Living Expenses:    Food Insecurity:     Worried About Running Out of Food in the Last Year:     [address] in the Last Year:    Transportation Needs:     Lack of Transportation (Medical):  Lack of Transportation (Non-Medical):    Physical Activity:     Days of Exercise per Week:     Minutes of Exercise per Session:    Stress:     Feeling of Stress :    Social Connections:     Frequency of Communication with Friends and Family:     Frequency of Social Gatherings with Friends and Family:     Attends Sikhism Services:     Active Member of Clubs or Organizations:     Attends Club or Organization Meetings:     Marital Status:    Intimate Partner Violence:     Fear of Current or Ex-Partner:     Emotionally Abused:     Physically Abused:     Sexually Abused:      No current facility-administered medications for this encounter.      Current Outpatient Medications   Medication Sig Dispense Refill    estradiol (ESTRACE) 0.1 MG/GM vaginal cream Place 2 g vaginally daily      fluticasone (FLONASE) 50 MCG/ACT nasal spray 1 spray by Each Nostril route daily 2 Bottle 5    pravastatin (PRAVACHOL) 20 MG tablet       alendronate (FOSAMAX) 70 MG tablet Take 70 mg by mouth      oxybutynin (DITROPAN-XL) 5 MG extended release tablet Take 5 mg by mouth      loratadine (CLARITIN) 10 MG tablet Take 10 mg by mouth daily      omeprazole (PRILOSEC) 40 MG delayed release capsule Take 40 mg by mouth as needed       No Known Allergies    Nursing Notes Reviewed    Physical Exam:  ED Triage Vitals [10/02/21 1616]   Enc Vitals Group      BP (!) 144/79      Pulse 81      Resp 18      Temp       Temp src       SpO2 95 %      Weight 151 lb (68.5 kg)      Height       Head Circumference       Peak Flow       Pain Score       Pain Loc       Pain Edu? Excl. in [address]? My pulse ox interpretation is - normal    General appearance:  No acute distress. Sitting comfortably in bed. Skin:  Warm. Dry. No erythema, contusions or lacerations noted to the affected wrist.  Eye:  Extraocular movements intact. Ears, nose, mouth and throat:  Oral mucosa moist   Extremity:  No swelling. Normal ROM. LUE: No gross deformity left upper extremity. Normal range of motion of left shoulder, left elbow and left wrist albeit with some pain at the wrist.  Patient can wiggle fingers without difficulty. There is tenderness along the extensor compartment near the carpal metacarpal area of the first digit reproducing pain. Wrist capillary refill throughout. Left hand is well-perfused. Sensation intact globally light touch. Median/ulnar/radial strength and sensation preserved. Heart:  Strong and symmetric peripheral pulses. Extremities are well perfused. Abdomen:  Non-distended. Respiratory:  Respirations nonlabored. Neurological:  Alert and oriented times 3. No focal neuro deficits. Sensation intact to light touch to distal upper/lower extremities; 5/5 and symmetric  and dorsi/plantar flexion.                 I have reviewed and interpreted all of the currently available lab results from this visit (if applicable):  Results for orders placed or performed during the hospital encounter of 10/02/21   Urinalysis with microscopic   Result Value Ref Range    Color, UA DARK YELLOW YELLOW    Clarity, UA CLOUDY CLEAR    Glucose, Urine 1+ NEGATIVE MG/DL    Bilirubin Urine 2+ NEGATIVE MG/DL    Ketones, Urine 1+ NEGATIVE MG/DL    Specific Gravity, UA 1.032 1.001 - 1.035    Blood, Urine 2+ NEGATIVE    pH, Urine 5.0 5.0 - 8.0    Protein, UA 2+ (A) NEGATIVE MG/DL    Urobilinogen, Urine 8.0 (H) 0.2 - 1.0 MG/DL    Nitrite Urine, Quantitative POSITIVE NEGATIVE    Leukocyte Esterase, Urine 1+ NEGATIVE    RBC, UA 12 TO 20 0 - 6 /HPF    WBC, UA 39 TO 57 0 - 5 /HPF    Epithelial Cells, UA 2 TO 4 /HPF    Cast Type NEGATIVE (A) NO CAST FORMS SEEN /HPF    Bacteria, UA MANY NEGATIVE /HPF    Crystal Type NEG NEGATIVE /HPF    Urinalysis Comments LESS THAN ONE ML SENT DONE ON DIRECT VIEW       Radiographs (if obtained):  [] The following radiograph was interpreted by myself in the absence of a radiologist:   [x] Radiologist's Report Reviewed:  XR WRIST LEFT (MIN 3 VIEWS)   Final Result   No acute abnormality. 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 EKG (if obtained): (All EKG's are interpreted by myself in the absence of a cardiologist)    Chart review shows recent radiographs:  No results found. MDM:  Pt presents as above. Emergent conditions considered. Presentation prompted initial urinalysis and x-ray. Urinalysis is positive for urinary tract infection. Based on prior culture/sensitivity from 2019 no place patient on Macrobid. Urine is sent for culture. X-ray demonstrating no acute abnormality with mild osteoarthritis at the base of the thumb. Ace wrap is applied to the left wrist.  RICE discussed. Primary care follow-up encouraged. I discussed specific signs and symptoms and when to return to the emergency department as well as need for close outpatient follow-up. Questions sought and answered with the patient. They voice understanding and agree with plan. Clinical Impression:  1. Acute cystitis without hematuria    2.  Strain of left wrist, initial encounter      Disposition referral (if applicable):  Shirlene Restrepo MD  [address]  [address] [facility]  [phone]    Schedule an appointment as soon as possible for a visit       [facility]  [address] [place] [id number] [phone]  Today  If symptoms worsen    Disposition medications (if applicable):  New Prescriptions    No medications on file       Comment: Please note this report has been produced using speech recognition software and may contain errors related to that system including errors in grammar, punctuation, and spelling, as well as words and phrases that may be inappropriate. If there are any questions or concerns please feel free to contact the dictating provider for clarification.          Paulina Black MD  10/02/21 9507

## 2021-10-02 NOTE — ED NOTES
Discharge instructions reviewed with patient. Reviewed prescriptions with patient. No additional questions asked. Voiced understanding. Encouraged patient to follow up as discussed by the ED physician. Discussed with patient alternating acetaminophen and ibuprofen for pain control. Reviewed taking medications every 6 hours as directed on packages. For example: take acetaminophen then three hours later take ibuprofen then three hours later take acetaminophen then take ibuprofen three hours later. By doing that something is given every three hours for pain reduction and comfort. Discussed RICE with patient. (Rest, Ice, Compression, and Elevation) REST affected area. ICE to painful area for 20 minutes as often as every hour. COMPRESSION with an ace wrap for comfort and support. Advised to check circulation frequent to assure wrap is not to tight. ELEVATION of the affected area, when not in use, to help decrease swelling.      Nirav Moreno RN  10/02/21 3433

## 2021-10-05 LAB
CULTURE: ABNORMAL
CULTURE: ABNORMAL
Lab: ABNORMAL
SPECIMEN: ABNORMAL

## 2021-11-17 ENCOUNTER — OFFICE VISIT (OUTPATIENT)
Dept: ORTHOPEDIC SURGERY | Age: 81
End: 2021-11-17
Payer: MEDICARE

## 2021-11-17 VITALS
WEIGHT: 154 LBS | RESPIRATION RATE: 16 BRPM | BODY MASS INDEX: 30.23 KG/M2 | HEIGHT: 60 IN | HEART RATE: 73 BPM | OXYGEN SATURATION: 96 %

## 2021-11-17 DIAGNOSIS — S63.502A SPRAIN OF LEFT WRIST, INITIAL ENCOUNTER: Primary | ICD-10-CM

## 2021-11-17 PROCEDURE — G8484 FLU IMMUNIZE NO ADMIN: HCPCS | Performed by: STUDENT IN AN ORGANIZED HEALTH CARE EDUCATION/TRAINING PROGRAM

## 2021-11-17 PROCEDURE — 1123F ACP DISCUSS/DSCN MKR DOCD: CPT | Performed by: STUDENT IN AN ORGANIZED HEALTH CARE EDUCATION/TRAINING PROGRAM

## 2021-11-17 PROCEDURE — G8399 PT W/DXA RESULTS DOCUMENT: HCPCS | Performed by: STUDENT IN AN ORGANIZED HEALTH CARE EDUCATION/TRAINING PROGRAM

## 2021-11-17 PROCEDURE — 99203 OFFICE O/P NEW LOW 30 MIN: CPT | Performed by: STUDENT IN AN ORGANIZED HEALTH CARE EDUCATION/TRAINING PROGRAM

## 2021-11-17 PROCEDURE — 4040F PNEUMOC VAC/ADMIN/RCVD: CPT | Performed by: STUDENT IN AN ORGANIZED HEALTH CARE EDUCATION/TRAINING PROGRAM

## 2021-11-17 PROCEDURE — G8417 CALC BMI ABV UP PARAM F/U: HCPCS | Performed by: STUDENT IN AN ORGANIZED HEALTH CARE EDUCATION/TRAINING PROGRAM

## 2021-11-17 PROCEDURE — 1036F TOBACCO NON-USER: CPT | Performed by: STUDENT IN AN ORGANIZED HEALTH CARE EDUCATION/TRAINING PROGRAM

## 2021-11-17 PROCEDURE — 1090F PRES/ABSN URINE INCON ASSESS: CPT | Performed by: STUDENT IN AN ORGANIZED HEALTH CARE EDUCATION/TRAINING PROGRAM

## 2021-11-17 PROCEDURE — G8427 DOCREV CUR MEDS BY ELIG CLIN: HCPCS | Performed by: STUDENT IN AN ORGANIZED HEALTH CARE EDUCATION/TRAINING PROGRAM

## 2021-11-17 ASSESSMENT — ENCOUNTER SYMPTOMS
STRIDOR: 0
NAUSEA: 0
COLOR CHANGE: 0
FACIAL SWELLING: 0
VOMITING: 0
BACK PAIN: 0
EYE PAIN: 0
COUGH: 0
SHORTNESS OF BREATH: 0
WHEEZING: 0
ABDOMINAL PAIN: 0
EYE REDNESS: 0
PHOTOPHOBIA: 0

## 2021-11-17 NOTE — PROGRESS NOTES
11/17/2021   Chief Complaint   Patient presents with    Injury     left wrist        History of Present Illness:                             Cori Sanchez is a [de-identified] y.o. female right hand-dominant female referred by self for evaluation and treatment of a left wrist injury. Patient states this occurred approximately 6 weeks prior when she was putting a mattress on a locked to bed and the mattress fell landed on her left wrist.  She has had continued pain over the dorsum of the wrist since the injury. She has some pain into the base of the thumb but mostly over the dorsal wrist and forearm proximally. No other areas of complaint at this time. No prior left upper extremity injury. Patient has had no treatment thus far. She is not in any type of splint but has been using Ace wrap which provides mild relief. Denies numbness, tingling, fever, chills. She is here today for first visit with me. Related history: negative for prior surgery, arthritis or disorders. Is affecting ADLs. Pain is 9/10 at it's worst.    Outside reports reviewed: Outside imaging reviewed    Patient's medications, allergies, past medical, surgical, social and family histories were reviewed and updated as appropriate. Patient has not previously attempted CSI, PT, NSAIDs for pain relief      MA HPI: Patient is a [de-identified]year old female. Patient is in the office today with left wrist pain. Patient states that he/she injured themselves by trying to put a mattress onto a lofted bed and the mattress fell and landed on her wrist.  Patient states that the injury happened on 10/2/21. Pain scale  9/10. She states that the pain has stayed the same since her injury about 1 month ago. She has been taking tylenol and ibuprofen and wearing an ace bandage to control pain.  She denies any previous injuries, surgeries or injections to her left wrist.  Dominant hand: right but uses her left a lot because of surgery to her right shoulder. Occupation: [employer]. Medical History  Patient's medications, allergies, past medical, surgical, social and family histories were reviewed and updated as appropriate.     Past Medical History:   Diagnosis Date    Acid reflux     Arthritis     \"Hands, Shoulders\"    Bladder prolapse, female, acquired     Bronchitis Last Episode 12-16    [facility]ch[facility]     Emphysema of lung ([facility]     No Pulmonologist At This Time    [place] (hard of hearing)     Bilateral Ears    Hyperlipidemia     \"Borderline\"    Hypertension     \"Borderline\"    Kidney stone     Retention of urine     Tooth missing     Lower Left    Urinary incontinence     UTI (urinary tract infection) In Past    No Current Symptoms    Wears glasses      Past Surgical History:   Procedure Laterality Date    BLADDER SURGERY  Last Done In 1990's    \"3 Bladder Lifts\"    BLADDER SURGERY      last surgery 2003    COLONOSCOPY  1960's    COLONOSCOPY  04/05/2017    pan divertics, int hem    CYST REMOVAL Right 02/15/2017    right wrist volar ganglion cyst excision    CYSTOSCOPY  1980's    Kidney Stone    DENTAL SURGERY      Tooth Extracted In Past    DILATION AND CURETTAGE OF UTERUS  1980's    FOOT SURGERY Left 7/24/2020    EXCISION OF SOFT TISSUE MASS LEFT PLANTAR FOOT performed by Josiah Ko DPM at [facility], TOTAL ABDOMINAL  Late 1970's    OVARY REMOVAL      age 40    [address] Right 2006     Family History   Problem Relation Age of Onset    Cancer Mother         Cancer Unsure What Kind    Early Death Mother 52        Cancer Unsure What Kind    Cancer Brother         Cancer Of The Spine    Early Death Brother 46        Cancer Of The Spine    Heart Disease Brother         Heart Attack    Heart Disease Brother         Heart Attack    Kidney Disease Brother     Kidney Disease Brother         Kidney Stones    Heart Disease Brother         Heart Attack, Heart Stents    Early Death Father 58        Emphysema    Emphysema Father     Heart Disease Father     Diabetes Sister     Heart Disease Brother     Kidney Disease Brother     Cancer Brother         Lymphoma    Other Brother         \"Took Part Of His Colon Out\"     Social History     Socioeconomic History    Marital status:      Spouse name: Not on file    Number of children: Not on file    Years of education: Not on file    Highest education level: Not on file   Occupational History    Not on file   Tobacco Use    Smoking status: Former Smoker     Packs/day: 0.50     Years: 29.00     Pack years: 14.50     Types: Cigarettes     Start date:      Quit date:      Years since quittin.8    Smokeless tobacco: Never Used   Vaping Use    Vaping Use: Never used   Substance and Sexual Activity    Alcohol use: No    Drug use: No    Sexual activity: Never   Other Topics Concern    Not on file   Social History Narrative    Not on file     Social Determinants of Health     Financial Resource Strain:     Difficulty of Paying Living Expenses: Not on file   Food Insecurity:     Worried About [address] in the Last Year: Not on file    Jarod of Food in the Last Year: Not on file   Transportation Needs:     Lack of Transportation (Medical): Not on file    Lack of Transportation (Non-Medical):  Not on file   Physical Activity:     Days of Exercise per Week: Not on file    Minutes of Exercise per Session: Not on file   Stress:     Feeling of Stress : Not on file   Social Connections:     Frequency of Communication with Friends and Family: Not on file    Frequency of Social Gatherings with Friends and Family: Not on file    Attends Oriental orthodox Services: Not on file    Active Member of Clubs or Organizations: Not on file    Attends Club or Organization Meetings: Not on file    Marital Status: Not on file   Intimate Partner Violence:     Fear of Current or Ex-Partner: Not on file   [facility] Emotionally Abused: Not on file    Physically Abused: Not on file    Sexually Abused: Not on file   Housing Stability:     Unable to Pay for Housing in the Last Year: Not on file    Number of Places Lived in the Last Year: Not on file    Unstable Housing in the Last Year: Not on file     Current Outpatient Medications   Medication Sig Dispense Refill    estradiol (ESTRACE) 0.1 MG/GM vaginal cream Place 2 g vaginally daily      fluticasone (FLONASE) 50 MCG/ACT nasal spray 1 spray by Each Nostril route daily 2 Bottle 5    pravastatin (PRAVACHOL) 20 MG tablet       alendronate (FOSAMAX) 70 MG tablet Take 70 mg by mouth      oxybutynin (DITROPAN-XL) 5 MG extended release tablet Take 5 mg by mouth      loratadine (CLARITIN) 10 MG tablet Take 10 mg by mouth daily      omeprazole (PRILOSEC) 40 MG delayed release capsule Take 40 mg by mouth as needed       No current facility-administered medications for this visit. No Known Allergies      Review of Systems   Constitutional: Negative for activity change, appetite change, chills, diaphoresis, fatigue, fever and unexpected weight change. HENT: Negative for congestion, ear pain, facial swelling, hearing loss and sneezing. Eyes: Negative for photophobia, pain and redness. Respiratory: Negative for cough, shortness of breath, wheezing and stridor. Cardiovascular: Negative for chest pain, palpitations and leg swelling. Gastrointestinal: Negative for abdominal pain, nausea and vomiting. Endocrine: Negative for cold intolerance and heat intolerance. Musculoskeletal: Positive for arthralgias and myalgias. Negative for back pain, gait problem, joint swelling, neck pain and neck stiffness. Skin: Negative for color change, pallor, rash and wound. Neurological: Negative for dizziness, facial asymmetry, weakness and numbness.                                                    Examination:  General Exam:  Vitals: Pulse 73   Resp 16   Ht 5' (1.524 m) Wt 154 lb (69.9 kg)   SpO2 96%   BMI 30.08 kg/m²    Physical Exam  Constitutional:       General: She is not in acute distress. Appearance: Normal appearance. HENT:      Head: Normocephalic and atraumatic. Eyes:      General:         Right eye: No discharge. Left eye: No discharge. Cardiovascular:      Rate and Rhythm: Normal rate. Pulses: Normal pulses. Pulmonary:      Effort: Pulmonary effort is normal. No respiratory distress. Chest:      Chest wall: No tenderness. Musculoskeletal:         General: Tenderness and signs of injury present. No swelling or deformity. Right shoulder: Normal.      Left shoulder: Normal.      Right upper arm: Normal.      Left upper arm: Normal.      Right elbow: Normal.      Left elbow: Normal.      Right forearm: Normal.      Left forearm: Tenderness present. No swelling, edema, deformity, lacerations or bony tenderness. Right wrist: Normal.      Left wrist: Tenderness and bony tenderness present. No swelling, deformity, effusion, lacerations, snuff box tenderness or crepitus. Normal range of motion. Normal pulse. Right hand: Normal.      Left hand: Normal.      Cervical back: Normal range of motion. Skin:     General: Skin is warm and dry. Capillary Refill: Capillary refill takes less than 2 seconds. Neurological:      General: No focal deficit present. Mental Status: She is alert and oriented to person, place, and time.    Psychiatric:         Mood and Affect: Mood normal.         Behavior: Behavior normal.        LEFT HAND EXAM:    OBSERVATION / INSPECTION:     Swelling none     Deformity none    Discoloration none     Scars none     Atrophy none      TENDERNESS / CREPITUS (T / C):      1st Dorsal compartment +/-    FCR +/-    FCU -/-    Ulnar Styloid -/-    Radial Styloid -/-    Palm -/-    Metacarpals -/-    Thumb CMC -/-     Thumb MCP -/-    Lesser MCPs -/-    PIP -/-    DIP -/-      Range of motion: ('*' = with pain) Left hand    Full extension of fingers, full flexion to the palm of all fingers    Quadrigia Effect -no sign of any tendon laceration as all fingers show full passive range of motion with wrist flexion extension      Right Elbow     AROM (PROM)     Extension 0 deg  (5 deg)     Flexion 145 deg (145 deg)        Pronation 90 deg  (90 deg)     Supination 80 deg  (80 deg)                Left Elbow     AROM (PROM)     Extension 0 deg  (5 deg)     Flexion 145 deg (145 deg)        Pronation 90 deg  (90 deg)     Supination 80 deg  (80 deg)        Right Wrist      Extension 80 deg (85 deg)     Flexion 80 deg (85 deg)        Ulnar Deviation  35 deg (40 deg)    Radial Deviation 35 deg (40 deg)             Left Wrist     AROM (PROM)     Extension 80 deg (85 deg)     Flexion 80 deg (85 deg)        Ulnar Deviation  35 deg (40 deg)    Radial Deviation 35 deg (40 deg)         WRIST AND HAND EXAMINATION:    See above noted areas of tenderness. Finkelstein's Test positive    TTP at 1st Dorsal Compartment positive    Ulnar-sided Compression Test neg         STRENGTH: ('*' = with pain)     Elbow Flexion: 5/5    Elbow Extension: 5/5    Wrist Flexion: 5-/5*    Wrist Extension: 5-/5*    : 5/5    Intrinsics: 5/5    EPL (Extensor pollicis longus): 5-/5*    Pinch Mechanism: 5/5      EXTREMITY NEURO-VASCULAR EXAMINATION: Sensation grossly intact to light touch all dermatomal regions. DTR 2+ Biceps, Triceps, BR and Negative Anastasiia's sign. Grossly intact motor function at Elbow, Wrist and Hand. Distal pulses radial and ulnar 2+, brisk cap refill, symmetric. Diagnostic testing:  X-ray images were reviewed by myself and discussed with the patient:  4 view of the left wrist and hand from outside facility demonstrates no acute osseous abnormalities. Slight increased scapholunate interval.  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 No sign of any fracture or subluxation. No sign of a soft tissue avulsion type injury. Mild triscaphate osteoarthritis. Mild osteopenia throughout      Office Procedures:  No orders of the defined types were placed in this encounter. Assessment and Plan    A: Left wrist sprain    P:     I had a thorough discussion with the patient regarding her left wrist pain and injury. I explained that she has some irritation of the tendons of the wrist but at this time we will treat her conservatively and limit her usage. She was placed in a thumb spica brace and she will use this at all times unless doing hygiene or icing. She will not lift more than 5 pounds but can continue working as long as she remains in the brace and follows her restrictions. .  She will follow-up in 1 month for reevaluation new x-rays. If she is doing well at that time we will consider home exercise program versus formal therapy. We will also plan on weaning her out of the splint at that time if she is doing well. If not doing well, we will consider advanced imaging. All questions were answered and patient voiced understanding.     Electronically signed by Adalid Archer DO on 11/17/2021 at 10:37 AM

## 2021-11-17 NOTE — PATIENT INSTRUCTIONS
Wrist brace given today. Please wear until your next visit. May remove for hygiene and range of motion. Rest and ice as needed. Ibuprofen and tylenol as needed.   Follow up in one month

## 2021-11-17 NOTE — PROGRESS NOTES
Patient is a [de-identified]year old female. Patient is in the office today with left wrist pain. Patient states that he/she injured themselves by trying to put a mattress onto a lofted bed and the mattress fell and landed on her wrist.  Patient states that the injury happened on 10/2/21. Pain scale  9/10. She states that the pain has stayed the same since her injury about 1 month ago. She has been taking tylenol and ibuprofen and wearing an ace bandage to control pain. She denies any previous injuries, surgeries or injections to her left wrist.  Dominant hand: right but uses her left a lot because of surgery to her right shoulder. 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

## 2021-12-17 ENCOUNTER — OFFICE VISIT (OUTPATIENT)
Dept: ORTHOPEDIC SURGERY | Age: 81
End: 2021-12-17
Payer: MEDICARE

## 2021-12-17 VITALS
WEIGHT: 154 LBS | HEIGHT: 60 IN | OXYGEN SATURATION: 97 % | HEART RATE: 66 BPM | BODY MASS INDEX: 30.23 KG/M2 | RESPIRATION RATE: 15 BRPM

## 2021-12-17 DIAGNOSIS — M65.832 EXTENSOR INTERSECTION SYNDROME OF LEFT WRIST: ICD-10-CM

## 2021-12-17 DIAGNOSIS — S63.502A SPRAIN OF LEFT WRIST, INITIAL ENCOUNTER: Primary | ICD-10-CM

## 2021-12-17 PROCEDURE — G8427 DOCREV CUR MEDS BY ELIG CLIN: HCPCS | Performed by: STUDENT IN AN ORGANIZED HEALTH CARE EDUCATION/TRAINING PROGRAM

## 2021-12-17 PROCEDURE — 99213 OFFICE O/P EST LOW 20 MIN: CPT | Performed by: STUDENT IN AN ORGANIZED HEALTH CARE EDUCATION/TRAINING PROGRAM

## 2021-12-17 PROCEDURE — G8484 FLU IMMUNIZE NO ADMIN: HCPCS | Performed by: STUDENT IN AN ORGANIZED HEALTH CARE EDUCATION/TRAINING PROGRAM

## 2021-12-17 PROCEDURE — 1036F TOBACCO NON-USER: CPT | Performed by: STUDENT IN AN ORGANIZED HEALTH CARE EDUCATION/TRAINING PROGRAM

## 2021-12-17 PROCEDURE — G8399 PT W/DXA RESULTS DOCUMENT: HCPCS | Performed by: STUDENT IN AN ORGANIZED HEALTH CARE EDUCATION/TRAINING PROGRAM

## 2021-12-17 PROCEDURE — G8417 CALC BMI ABV UP PARAM F/U: HCPCS | Performed by: STUDENT IN AN ORGANIZED HEALTH CARE EDUCATION/TRAINING PROGRAM

## 2021-12-17 PROCEDURE — 1123F ACP DISCUSS/DSCN MKR DOCD: CPT | Performed by: STUDENT IN AN ORGANIZED HEALTH CARE EDUCATION/TRAINING PROGRAM

## 2021-12-17 PROCEDURE — 1090F PRES/ABSN URINE INCON ASSESS: CPT | Performed by: STUDENT IN AN ORGANIZED HEALTH CARE EDUCATION/TRAINING PROGRAM

## 2021-12-17 PROCEDURE — 4040F PNEUMOC VAC/ADMIN/RCVD: CPT | Performed by: STUDENT IN AN ORGANIZED HEALTH CARE EDUCATION/TRAINING PROGRAM

## 2021-12-17 ASSESSMENT — ENCOUNTER SYMPTOMS
BACK PAIN: 0
PHOTOPHOBIA: 0
EYE REDNESS: 0
EYE PAIN: 0
VOMITING: 0
COLOR CHANGE: 0
STRIDOR: 0
NAUSEA: 0
FACIAL SWELLING: 0
ABDOMINAL PAIN: 0
SHORTNESS OF BREATH: 0
WHEEZING: 0
COUGH: 0

## 2021-12-17 NOTE — PATIENT INSTRUCTIONS
Continue weight-bearing as tolerated. Continue range of motion exercises as instructed. Ice and elevate as needed. Tylenol or Motrin for pain. MRI left wrist   Follow up once MRI is completed  Central scheduling [phone] will be calling you to schedule your MRI. If you do not hear from them with in a week give them a call.

## 2021-12-17 NOTE — PROGRESS NOTES
12/17/2021   Chief Complaint   Patient presents with    Wrist Pain     left wrist       Updated HPI: She is here for reevaluation of her left wrist.  She continues with pain at the first and second dorsal compartment of the wrist.  Bracing and therapy did not provide any relief. She has no new issues or complaints and no new injury to the wrist.    Previous HPI (11/17/2021):                             Tee Strickland is a 80 y.o. female right hand-dominant female referred by self for evaluation and treatment of a left wrist injury. Patient states this occurred approximately 6 weeks prior when she was putting a mattress on a locked to bed and the mattress fell landed on her left wrist.  She has had continued pain over the dorsum of the wrist since the injury. She has some pain into the base of the thumb but mostly over the dorsal wrist and forearm proximally. No other areas of complaint at this time. No prior left upper extremity injury. Patient has had no treatment thus far. She is not in any type of splint but has been using Ace wrap which provides mild relief. Denies numbness, tingling, fever, chills. She is here today for first visit with me. Related history: negative for prior surgery, arthritis or disorders. Is affecting ADLs. Pain is 9/10 at it's worst.    Outside reports reviewed: Outside imaging reviewed    Patient's medications, allergies, past medical, surgical, social and family histories were reviewed and updated as appropriate. Patient has not previously attempted CSI, PT, NSAIDs for pain relief      MA HPI: Patient is a [de-identified]year old female. Patient is in the office today with left wrist pain. Patient states that he/she injured themselves by trying to put a mattress onto a lofted bed and the mattress fell and landed on her wrist.  Patient states that the injury happened on 10/2/21. Pain scale  9/10.  She states that the pain has stayed the same since her injury about 1 month ago. She has been taking tylenol and ibuprofen and wearing an ace bandage to control pain. She denies any previous injuries, surgeries or injections to her left wrist.  Dominant hand: right but uses her left a lot because of surgery to her right shoulder. 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 Medical History  Patient's medications, allergies, past medical, surgical, social and family histories were reviewed and updated as appropriate.     Past Medical History:   Diagnosis Date    Acid reflux     Arthritis     \"Hands, Shoulders\"    Bladder prolapse, female, acquired     Bronchitis Last Episode 12-16    [facility]ch[facility]     Emphysema of lung ([facility]     No Pulmonologist At This Time    [place] (hard of hearing)     Bilateral Ears    Hyperlipidemia     \"Borderline\"    Hypertension     \"Borderline\"    Kidney stone     Retention of urine     Tooth missing     Lower Left    Urinary incontinence     UTI (urinary tract infection) In Past    No Current Symptoms    Wears glasses      Past Surgical History:   Procedure Laterality Date    BLADDER SURGERY  Last Done In 1990's    \"3 Bladder Lifts\"    BLADDER SURGERY      last surgery 2003    COLONOSCOPY  1960's    COLONOSCOPY  04/05/2017    pan divertics, int hem    CYST REMOVAL Right 02/15/2017    right wrist volar ganglion cyst excision    CYSTOSCOPY  1980's    Kidney Stone    DENTAL SURGERY      Tooth Extracted In Past    DILATION AND CURETTAGE OF UTERUS  1980's    FOOT SURGERY Left 7/24/2020    EXCISION OF SOFT TISSUE MASS LEFT PLANTAR FOOT performed by Zhang Novoa DPM at [address] [place] [address], TOTAL ABDOMINAL  Late 1970's    OVARY REMOVAL      age 40   [de-identified] CUFF REPAIR Right 2006     Family History   Problem Relation Age of Onset    Cancer Mother         Cancer Unsure What Kind    Early Death Mother 52        Cancer Unsure What Kind    Cancer Brother         Cancer Of The Spine    Early Death Brother 46 Cancer Of The Spine    Heart Disease Brother         Heart Attack    Heart Disease Brother         Heart Attack    Kidney Disease Brother     Kidney Disease Brother         Kidney Stones    Heart Disease Brother         Heart Attack, Heart Stents    Early Death Father 58        Emphysema    Emphysema Father     Heart Disease Father     Diabetes Sister     Heart Disease Brother     Kidney Disease Brother     Cancer Brother         Lymphoma    Other Brother         \"Took Part Of His Colon Out\"     Social History     Socioeconomic History    Marital status:      Spouse name: Not on file    Number of children: Not on file    Years of education: Not on file    Highest education level: Not on file   Occupational History    Not on file   Tobacco Use    Smoking status: Former Smoker     Packs/day: 0.50     Years: 29.00     Pack years: 14.50     Types: Cigarettes     Start date:      Quit date:      Years since quittin.9    Smokeless tobacco: Never Used   Vaping Use    Vaping Use: Never used   Substance and Sexual Activity    Alcohol use: No    Drug use: No    Sexual activity: Never   Other Topics Concern    Not on file   Social History Narrative    Not on file     Social Determinants of Health     Financial Resource Strain:     Difficulty of Paying Living Expenses: Not on file   Food Insecurity:     Worried About [address] [employer] in the Last Year: Not on file    Jarod of Food in the Last Year: Not on file   Transportation Needs:     Lack of Transportation (Medical): Not on file    Lack of Transportation (Non-Medical):  Not on file   Physical Activity:     Days of Exercise per Week: Not on file    Minutes of Exercise per Session: Not on file   Stress:     Feeling of Stress : Not on file   Social Connections:     Frequency of Communication with Friends and Family: Not on file    Frequency of Social Gatherings with Friends and Family: Not on file    Attends Mosque Services: Not on file    Active Member of Clubs or Organizations: Not on file    Attends Club or Organization Meetings: Not on file    Marital Status: Not on file   Intimate Partner Violence:     Fear of Current or Ex-Partner: Not on file    Emotionally Abused: Not on file    Physically Abused: Not on file    Sexually Abused: Not on file   Housing Stability:     Unable to Pay for Housing in the Last Year: Not on file    Number of [place] in the Last Year: Not on file    Unstable Housing in the Last Year: Not on file     Current Outpatient Medications   Medication Sig Dispense Refill    estradiol (ESTRACE) 0.1 MG/GM vaginal cream Place 2 g vaginally daily      fluticasone (FLONASE) 50 MCG/ACT nasal spray 1 spray by Each Nostril route daily 2 Bottle 5    pravastatin (PRAVACHOL) 20 MG tablet       alendronate (FOSAMAX) 70 MG tablet Take 70 mg by mouth      oxybutynin (DITROPAN-XL) 5 MG extended release tablet Take 5 mg by mouth      loratadine (CLARITIN) 10 MG tablet Take 10 mg by mouth daily (Patient not taking: Reported on 11/17/2021)      omeprazole (PRILOSEC) 40 MG delayed release capsule Take 40 mg by mouth as needed       No current facility-administered medications for this visit. No Known Allergies      Review of Systems   Constitutional: Negative for activity change, appetite change, chills, diaphoresis, fatigue, fever and unexpected weight change. HENT: Negative for congestion, ear pain, facial swelling, hearing loss and sneezing. Eyes: Negative for photophobia, pain and redness. Respiratory: Negative for cough, shortness of breath, wheezing and stridor. Cardiovascular: Negative for chest pain, palpitations and leg swelling. Gastrointestinal: Negative for abdominal pain, nausea and vomiting. Endocrine: Negative for cold intolerance and heat intolerance. Musculoskeletal: Positive for arthralgias and myalgias.  Negative for back pain, gait problem, joint swelling, neck pain and neck stiffness. Skin: Negative for color change, pallor, rash and wound. Neurological: Negative for dizziness, facial asymmetry, weakness and numbness. Examination:  General Exam:  Vitals: Resp 15   Ht 5' (1.524 m)   Wt 154 lb (69.9 kg)   BMI 30.08 kg/m²    Physical Exam  Constitutional:       General: She is not in acute distress. Appearance: Normal appearance. HENT:      Head: Normocephalic and atraumatic. Eyes:      General:         Right eye: No discharge. Left eye: No discharge. Cardiovascular:      Rate and Rhythm: Normal rate. Pulses: Normal pulses. Pulmonary:      Effort: Pulmonary effort is normal. No respiratory distress. Chest:      Chest wall: No tenderness. Musculoskeletal:         General: Tenderness present. No swelling, deformity or signs of injury. Right shoulder: Normal.      Left shoulder: Normal.      Right upper arm: Normal.      Left upper arm: Normal.      Right elbow: Normal.      Left elbow: Normal.      Right forearm: Normal.      Left forearm: Tenderness present. No swelling, edema, deformity, lacerations or bony tenderness. Right wrist: Normal.      Left wrist: Tenderness and bony tenderness present. No swelling, deformity, effusion, lacerations, snuff box tenderness or crepitus. Normal range of motion. Normal pulse. Right hand: Normal.      Left hand: Normal.      Cervical back: Normal range of motion. Skin:     General: Skin is warm and dry. Capillary Refill: Capillary refill takes less than 2 seconds. Neurological:      General: No focal deficit present. Mental Status: She is alert and oriented to person, place, and time.    Psychiatric:         Mood and Affect: Mood normal.         Behavior: Behavior normal.        LEFT HAND EXAM:    OBSERVATION / INSPECTION:     Swelling none     Deformity none    Discoloration none     Scars none     Atrophy myself and discussed with the patient:  4 view of the left wrist and hand from outside facility demonstrates no acute osseous abnormalities. Slight increased scapholunate interval.  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 No sign of any fracture or subluxation. No sign of a soft tissue avulsion type injury. Mild triscaphate osteoarthritis. Mild osteopenia throughout      Office Procedures:  No orders of the defined types were placed in this encounter. Assessment and Plan    A: Left wrist sprain    P:   I discussed with the patient her imaging as well as physicals and findings. I explained that because she is failed bracing the neck step would be cortisone injection versus operative intervention. Patient does not want to do a cortisone injection at this time. We will proceed with MRI of the left wrist to determine the quality of the tendons as well as help delineate intersection versus de Quervain's issues. Patient will return after MRI to discuss findings and treatment course. She will continue to use her brace, ice into the home exercise program.  All questions were answered patient voiced understanding.     Electronically signed by Maura Amaya DO on 12/17/2021 at 9:24 AM

## 2021-12-17 NOTE — PROGRESS NOTES
Patient returns to the office today for fu of the left wrist. Pt states pain today is a 8/10 will increase with use of the left wrist. Pt states that pain is in her left thumb and into the wrist. Pt states she has been using ice daily with no relief

## 2022-01-04 ENCOUNTER — HOSPITAL ENCOUNTER (OUTPATIENT)
Dept: MRI IMAGING | Age: 82
Discharge: HOME | End: 2022-01-04
Payer: COMMERCIAL

## 2022-01-04 DIAGNOSIS — M65.832 EXTENSOR INTERSECTION SYNDROME OF LEFT WRIST: ICD-10-CM

## 2022-01-04 PROCEDURE — 73221 MRI JOINT UPR EXTREM W/O DYE: CPT

## 2022-01-12 ENCOUNTER — OFFICE VISIT (OUTPATIENT)
Dept: ORTHOPEDIC SURGERY | Age: 82
End: 2022-01-12
Payer: COMMERCIAL

## 2022-01-12 VITALS
RESPIRATION RATE: 16 BRPM | HEART RATE: 79 BPM | BODY MASS INDEX: 30.23 KG/M2 | OXYGEN SATURATION: 95 % | WEIGHT: 154 LBS | HEIGHT: 60 IN

## 2022-01-12 DIAGNOSIS — M65.4 DE QUERVAIN'S DISEASE (TENOSYNOVITIS): Primary | ICD-10-CM

## 2022-01-12 PROCEDURE — 99213 OFFICE O/P EST LOW 20 MIN: CPT | Performed by: STUDENT IN AN ORGANIZED HEALTH CARE EDUCATION/TRAINING PROGRAM

## 2022-01-12 ASSESSMENT — ENCOUNTER SYMPTOMS
STRIDOR: 0
NAUSEA: 0
EYE REDNESS: 0
PHOTOPHOBIA: 0
WHEEZING: 0
COLOR CHANGE: 0
EYE PAIN: 0
VOMITING: 0
BACK PAIN: 0
COUGH: 0
FACIAL SWELLING: 0
SHORTNESS OF BREATH: 0
ABDOMINAL PAIN: 0

## 2022-01-12 NOTE — PROGRESS NOTES
Patient comes in today for a follow up of a left wrist MRI. She rates her pain at 9/10 currently. She states that her pain is the same. She denies any new falls or injuries since her last visit. 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       Short segment split tearing of the abductor pollicis longus near its distal   insertion.       Moderate multifocal arthrosis.       Signal heterogeneity of the median nerve within the carpal tunnel may   represent compression in the appropriate clinical setting.

## 2022-01-12 NOTE — PROGRESS NOTES
1/12/2022   Chief Complaint   Patient presents with    Wrist Pain     left      Updated HPI: Patient is here for reevaluation of her left wrist and to review MRI of her left wrist.  She has no new complaints. She states she has no improvement even with use of the thumb spica brace. She has no new injuries or changes in her symptoms since last being seen. Updated HPI (11/29/2021): She is here for reevaluation of her left wrist.  She continues with pain at the first and second dorsal compartment of the wrist.  Bracing and therapy did not provide any relief. She has no new issues or complaints and no new injury to the wrist.    Previous HPI (11/17/2021):                             Desiree Son is a 80 y.o. female right hand-dominant female referred by self for evaluation and treatment of a left wrist injury. Patient states this occurred approximately 6 weeks prior when she was putting a mattress on a locked to bed and the mattress fell landed on her left wrist.  She has had continued pain over the dorsum of the wrist since the injury. She has some pain into the base of the thumb but mostly over the dorsal wrist and forearm proximally. No other areas of complaint at this time. No prior left upper extremity injury. Patient has had no treatment thus far. She is not in any type of splint but has been using Ace wrap which provides mild relief. Denies numbness, tingling, fever, chills. She is here today for first visit with me. Related history: negative for prior surgery, arthritis or disorders. Is affecting ADLs. Pain is 9/10 at it's worst.    Outside reports reviewed: Outside imaging reviewed    Patient's medications, allergies, past medical, surgical, social and family histories were reviewed and updated as appropriate. Patient has not previously attempted CSI, PT, NSAIDs for pain relief      MA HPI: Patient is a [de-identified]year old female.  Patient is in the office today with left wrist pain. Patient states that he/she injured themselves by trying to put a mattress onto a lofted bed and the mattress fell and landed on her wrist.  Patient states that the injury happened on 10/2/21. Pain scale  9/10. She states that the pain has stayed the same since her injury about 1 month ago. She has been taking tylenol and ibuprofen and wearing an ace bandage to control pain. She denies any previous injuries, surgeries or injections to her left wrist.  Dominant hand: right but uses her left a lot because of surgery to her right shoulder. Occupation: Cleans houses. Medical History  Patient's medications, allergies, past medical, surgical, social and family histories were reviewed and updated as appropriate.     Past Medical History:   Diagnosis Date    Acid reflux     Arthritis     \"Hands, Shoulders\"    Bladder prolapse, female, acquired     Bronchitis Last Episode 12-16    COPD (chronic obstructive pulmonary disease) ([facility])     Emphysema of lung ([facility])     No Pulmonologist At This Time    [place] (hard of hearing)     Bilateral Ears    Hyperlipidemia     \"Borderline\"    Hypertension     \"Borderline\"    Kidney stone     Retention of urine     Tooth missing     Lower Left    Urinary incontinence     UTI (urinary tract infection) In Past    No Current Symptoms    Wears glasses      Past Surgical History:   Procedure Laterality Date    BLADDER SURGERY  Last Done In 1990's    \"3 Bladder Lifts\"    BLADDER SURGERY      last surgery 2003    COLONOSCOPY  1960's    COLONOSCOPY  04/05/2017    pan divertics, int hem    CYST REMOVAL Right 02/15/2017    right wrist volar ganglion cyst excision    CYSTOSCOPY  1980's    Kidney Stone    DENTAL SURGERY      Tooth Extracted In Past    DILATION AND CURETTAGE OF UTERUS  1980's    FOOT SURGERY Left 7/24/2020    EXCISION OF SOFT TISSUE MASS LEFT PLANTAR FOOT performed by Maude Patricia DPM at [address] [facility], TOTAL ABDOMINAL  Late 1970's    OVARY REMOVAL file    Minutes of Exercise per Session: Not on file   Stress:     Feeling of Stress : Not on file   Social Connections:     Frequency of Communication with Friends and Family: Not on file    Frequency of Social Gatherings with Friends and Family: Not on file    Attends [employer] Services: Not on file    Active Member of [address] [place] [address] or Organizations: Not on file    Attends Club or Organization Meetings: Not on file    Marital Status: Not on file   Intimate Partner Violence:     Fear of Current or Ex-Partner: Not on file    Emotionally Abused: Not on file    Physically Abused: Not on file    Sexually Abused: Not on file   Housing Stability:     Unable to Pay for Housing in the Last Year: Not on file    Number of [place] in the Last Year: Not on file    Unstable Housing in the Last Year: Not on file     Current Outpatient Medications   Medication Sig Dispense Refill    estradiol (ESTRACE) 0.1 MG/GM vaginal cream Place 2 g vaginally daily      fluticasone (FLONASE) 50 MCG/ACT nasal spray 1 spray by Each Nostril route daily 2 Bottle 5    pravastatin (PRAVACHOL) 20 MG tablet       oxybutynin (DITROPAN-XL) 5 MG extended release tablet Take 5 mg by mouth      omeprazole (PRILOSEC) 40 MG delayed release capsule Take 40 mg by mouth as needed      alendronate (FOSAMAX) 70 MG tablet Take 70 mg by mouth      loratadine (CLARITIN) 10 MG tablet Take 10 mg by mouth daily (Patient not taking: Reported on 11/17/2021)       No current facility-administered medications for this visit. No Known Allergies      Review of Systems   Constitutional: Negative for activity change, appetite change, chills, diaphoresis, fatigue, fever and unexpected weight change. HENT: Negative for congestion, ear pain, facial swelling, hearing loss and sneezing. Eyes: Negative for photophobia, pain and redness. Respiratory: Negative for cough, shortness of breath, wheezing and stridor.     Cardiovascular: Negative for chest pain, palpitations and leg swelling. Gastrointestinal: Negative for abdominal pain, nausea and vomiting. Endocrine: Negative for cold intolerance and heat intolerance. Musculoskeletal: Positive for arthralgias and myalgias. Negative for back pain, gait problem, joint swelling, neck pain and neck stiffness. Skin: Negative for color change, pallor, rash and wound. Neurological: Negative for dizziness, facial asymmetry, weakness and numbness. Examination:  General Exam:  Vitals: Pulse 79   Resp 16   Ht 5' (1.524 m)   Wt 154 lb (69.9 kg)   SpO2 95%   BMI 30.08 kg/m²    Physical Exam  Constitutional:       General: She is not in acute distress. Appearance: Normal appearance. HENT:      Head: Normocephalic and atraumatic. Eyes:      General:         Right eye: No discharge. Left eye: No discharge. Cardiovascular:      Rate and Rhythm: Normal rate. Pulses: Normal pulses. Pulmonary:      Effort: Pulmonary effort is normal. No respiratory distress. Chest:      Chest wall: No tenderness. Musculoskeletal:         General: Tenderness present. No swelling, deformity or signs of injury. Right shoulder: Normal.      Left shoulder: Normal.      Right upper arm: Normal.      Left upper arm: Normal.      Right elbow: Normal.      Left elbow: Normal.      Right forearm: Normal.      Left forearm: Tenderness present. No swelling, edema, deformity, lacerations or bony tenderness. Right wrist: Normal.      Left wrist: Tenderness and bony tenderness present. No swelling, deformity, effusion, lacerations, snuff box tenderness or crepitus. Normal range of motion. Normal pulse. Right hand: Normal.      Left hand: Normal.      Cervical back: Normal range of motion. Skin:     General: Skin is warm and dry. Capillary Refill: Capillary refill takes less than 2 seconds. Neurological:      General: No focal deficit present. Mental Status: She is alert and oriented to person, place, and time. Psychiatric:         Mood and Affect: Mood normal.         Behavior: Behavior normal.        LEFT HAND EXAM:    OBSERVATION / INSPECTION:     Swelling none     Deformity none    Discoloration none     Scars none     Atrophy none      TENDERNESS / CREPITUS (T / C):      1st Dorsal compartment + including 2nd compartment as well /-    FCR +/-    FCU -/-    Ulnar Styloid -/-    Radial Styloid -/-    Palm -/-    Metacarpals -/-    Thumb CMC -/-     Thumb MCP -/-    Lesser MCPs -/-    PIP -/-    DIP -/-      Range of motion: ('*' = with pain)       Left hand    Full extension of fingers, full flexion to the palm of all fingers    Quadrigia Effect -no sign of any tendon laceration as all fingers show full passive range of motion with wrist flexion extension      Right Elbow     AROM (PROM)     Extension 0 deg  (5 deg)     Flexion 145 deg (145 deg)        Pronation 90 deg  (90 deg)     Supination 80 deg  (80 deg)                Left Elbow     AROM (PROM)     Extension 0 deg  (5 deg)     Flexion 145 deg (145 deg)        Pronation 90 deg  (90 deg)     Supination 80 deg  (80 deg)        Right Wrist      Extension 80 deg (85 deg)     Flexion 80 deg (85 deg)        Ulnar Deviation  35 deg (40 deg)    Radial Deviation 35 deg (40 deg)             Left Wrist     AROM (PROM)     Extension 80 deg (85 deg)     Flexion 80 deg (85 deg)        Ulnar Deviation  35 deg (40 deg)    Radial Deviation 35 deg (40 deg)         WRIST AND HAND EXAMINATION:    See above noted areas of tenderness.      Finkelstein's Test positive    TTP at 1st Dorsal Compartment positive    Ulnar-sided Compression Test neg         STRENGTH: ('*' = with pain)     Elbow Flexion: 5/5    Elbow Extension: 5/5    Wrist Flexion: 5-/5*    Wrist Extension: 5-/5*    : 5/5    Intrinsics: 5/5    EPL (Extensor pollicis longus): 5-/5*    Pinch Mechanism: 5/5      EXTREMITY NEURO-VASCULAR EXAMINATION: Sensation grossly intact to light touch all dermatomal regions. DTR 2+ Biceps, Triceps, BR and Negative Anastasiia's sign. Grossly intact motor function at Elbow, Wrist and Hand. Distal pulses radial and ulnar 2+, brisk cap refill, symmetric. Diagnostic testing:  MRI completed on 1/4/22:  Impression   Constellation of MRI findings compatible with de Quervain tenosynovitis.       Short segment split tearing of the abductor pollicis longus near its distal   insertion.       Moderate multifocal arthrosis.       Signal heterogeneity of the median nerve within the carpal tunnel may   represent compression in the appropriate clinical setting. Previous imaging:  X-ray images were reviewed by myself and discussed with the patient:  4 view of the left wrist and hand from outside facility demonstrates no acute osseous abnormalities. Slight increased scapholunate interval.  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 No sign of any fracture or subluxation. No sign of a soft tissue avulsion type injury. Mild triscaphate osteoarthritis. Mild osteopenia throughout      Office Procedures:  No orders of the defined types were placed in this encounter. Assessment and Plan    A: Left wrist de Quervain's tenosynovitis    P:   I had a thorough discussion and review of the MRI with the patient. I explained that this did confirm our previous diagnosis of de Quervain's tenosynovitis. The patient does have a short area of split tearing of the abductor pollicis longus near its insertion and patient does admit to pain in this area. Because of this area pain that I would not treat surgically, I will refer her out for a second opinion with a hand surgeon. I explained that I am happy to do the de Quervain's release but any type of tendon repair would have to be done by separate surgeon. She is more than welcome to have surgery done by the hand surgeon I am referring her to.   She is to return to me if she would like to further discuss surgery or if she has any additional issues. She should continue using the brace if it provides relief. All questions were answered and patient voiced understanding.     Electronically signed by Praful Bolden DO on 1/12/2022 at 2:27 PM

## 2023-11-30 ENCOUNTER — HOSPITAL ENCOUNTER (OUTPATIENT)
Dept: MRI IMAGING | Age: 83
Discharge: HOME | End: 2023-11-30
Attending: INTERNAL MEDICINE
Payer: MEDICARE

## 2023-11-30 DIAGNOSIS — K76.0 HEPATIC STEATOSIS: ICD-10-CM

## 2023-11-30 PROCEDURE — 74181 MRI ABDOMEN W/O CONTRAST: CPT

## 2024-02-07 ENCOUNTER — HOSPITAL ENCOUNTER (OUTPATIENT)
Age: 84
Discharge: HOME | End: 2024-02-07
Payer: COMMERCIAL

## 2024-02-07 LAB
ALBUMIN SERPL-MCNC: 4.3 GM/DL (ref 3.4–5)
ALP BLD-CCNC: 133 IU/L (ref 40–129)
ALT SERPL-CCNC: 27 U/L (ref 10–40)
ANION GAP SERPL CALCULATED.3IONS-SCNC: 16 MMOL/L (ref 7–16)
APTT: 27.1 SECONDS (ref 25.1–37.1)
AST SERPL-CCNC: 38 IU/L (ref 15–37)
BASOPHILS ABSOLUTE: 0 K/CU MM
BASOPHILS RELATIVE PERCENT: 0.5 % (ref 0–1)
BILIRUB SERPL-MCNC: 1 MG/DL (ref 0–1)
BILIRUBIN DIRECT: 0.3 MG/DL (ref 0–0.3)
BILIRUBIN, INDIRECT: 0.7 MG/DL (ref 0–0.7)
BUN SERPL-MCNC: 17 MG/DL (ref 6–23)
CALCIUM SERPL-MCNC: 9.5 MG/DL (ref 8.3–10.6)
CHLORIDE BLD-SCNC: 102 MMOL/L (ref 99–110)
CO2: 22 MMOL/L (ref 21–32)
CREAT SERPL-MCNC: 0.6 MG/DL (ref 0.6–1.1)
DIFFERENTIAL TYPE: ABNORMAL
EOSINOPHILS ABSOLUTE: 0 K/CU MM
EOSINOPHILS RELATIVE PERCENT: 0.7 % (ref 0–3)
FERRITIN: 329 NG/ML (ref 15–150)
GFR SERPL CREATININE-BSD FRML MDRD: >60 ML/MIN/1.73M2
GLUCOSE SERPL-MCNC: 153 MG/DL (ref 70–99)
HBV CORE IGM SERPL QL IA: NON REACTIVE
HBV SURFACE AB SERPL IA-ACNC: <3.5 M[IU]/ML
HBV SURFACE AG SERPL QL IA: NON REACTIVE
HCT VFR BLD CALC: 43.2 % (ref 37–47)
HCV AB SERPL QL IA: NON REACTIVE
HEMOGLOBIN: 14.3 GM/DL (ref 12.5–16)
IGA: 278 MG/DL (ref 69–382)
IGG,SERUM: 1030 MG/DL (ref 723–1685)
IMMATURE NEUTROPHIL %: 0.2 % (ref 0–0.43)
INR BLD: 1 INDEX
IRON: 144 UG/DL (ref 37–145)
LYMPHOCYTES ABSOLUTE: 1.7 K/CU MM
LYMPHOCYTES RELATIVE PERCENT: 31.2 % (ref 24–44)
MCH RBC QN AUTO: 29.6 PG (ref 27–31)
MCHC RBC AUTO-ENTMCNC: 33.1 % (ref 32–36)
MCV RBC AUTO: 89.4 FL (ref 78–100)
MONOCYTES ABSOLUTE: 0.4 K/CU MM
MONOCYTES RELATIVE PERCENT: 7.6 % (ref 0–4)
NUCLEATED RBC %: 0 %
PCT TRANSFERRIN: 53 % (ref 10–44)
PDW BLD-RTO: 13.6 % (ref 11.7–14.9)
PLATELET # BLD: 170 K/CU MM (ref 140–440)
PMV BLD AUTO: 11.6 FL (ref 7.5–11.1)
POTASSIUM SERPL-SCNC: 4.2 MMOL/L (ref 3.5–5.1)
PROTHROMBIN TIME: 13.7 SECONDS (ref 11.7–14.5)
RBC # BLD: 4.83 M/CU MM (ref 4.2–5.4)
SEGMENTED NEUTROPHILS ABSOLUTE COUNT: 3.3 K/CU MM
SEGMENTED NEUTROPHILS RELATIVE PERCENT: 59.8 % (ref 36–66)
SODIUM BLD-SCNC: 140 MMOL/L (ref 135–145)
TOTAL IMMATURE NEUTOROPHIL: 0.01 K/CU MM
TOTAL IRON BINDING CAPACITY: 271 UG/DL (ref 250–450)
TOTAL NUCLEATED RBC: 0 K/CU MM
TOTAL PROTEIN: 6.9 GM/DL (ref 6.4–8.2)
UNSATURATED IRON BINDING CAPACITY: 127 UG/DL (ref 110–370)
WBC # BLD: 5.5 K/CU MM (ref 4–10.5)

## 2024-02-07 PROCEDURE — 83540 ASSAY OF IRON: CPT

## 2024-02-07 PROCEDURE — 82248 BILIRUBIN DIRECT: CPT

## 2024-02-07 PROCEDURE — 85610 PROTHROMBIN TIME: CPT

## 2024-02-07 PROCEDURE — 82728 ASSAY OF FERRITIN: CPT

## 2024-02-07 PROCEDURE — 36415 COLL VENOUS BLD VENIPUNCTURE: CPT

## 2024-02-07 PROCEDURE — 82103 ALPHA-1-ANTITRYPSIN TOTAL: CPT

## 2024-02-07 PROCEDURE — 86708 HEPATITIS A ANTIBODY: CPT

## 2024-02-07 PROCEDURE — 82784 ASSAY IGA/IGD/IGG/IGM EACH: CPT

## 2024-02-07 PROCEDURE — 86706 HEP B SURFACE ANTIBODY: CPT

## 2024-02-07 PROCEDURE — 87340 HEPATITIS B SURFACE AG IA: CPT

## 2024-02-07 PROCEDURE — 83550 IRON BINDING TEST: CPT

## 2024-02-07 PROCEDURE — 85025 COMPLETE CBC W/AUTO DIFF WBC: CPT

## 2024-02-07 PROCEDURE — 80053 COMPREHEN METABOLIC PANEL: CPT

## 2024-02-07 PROCEDURE — 86705 HEP B CORE ANTIBODY IGM: CPT

## 2024-02-07 PROCEDURE — 86256 FLUORESCENT ANTIBODY TITER: CPT

## 2024-02-07 PROCEDURE — 85730 THROMBOPLASTIN TIME PARTIAL: CPT

## 2024-02-07 PROCEDURE — 83516 IMMUNOASSAY NONANTIBODY: CPT

## 2024-02-07 PROCEDURE — 86038 ANTINUCLEAR ANTIBODIES: CPT

## 2024-02-07 PROCEDURE — 86803 HEPATITIS C AB TEST: CPT

## 2024-02-07 PROCEDURE — 82390 ASSAY OF CERULOPLASMIN: CPT

## 2024-02-08 LAB — HAV AB SER QL IA: NEGATIVE

## 2024-02-09 LAB
A1AT SERPL-MCNC: 160 MG/DL (ref 90–200)
CERULOPLASMIN SERPL-MCNC: 25 MG/DL (ref 16–45)
MITOCHONDRIA M2 IGG SER-ACNC: 12.8 UNITS (ref 0–24.9)
NUCLEAR IGG SER QL IA: NORMAL
SMA IGG SER-ACNC: 10 UNITS (ref 0–19)
TTG IGA SER IA-ACNC: <1.02 FLU (ref 0–4.99)

## 2024-03-19 ENCOUNTER — INITIAL CONSULT (OUTPATIENT)
Dept: CARDIOLOGY CLINIC | Age: 84
End: 2024-03-19
Payer: COMMERCIAL

## 2024-03-19 VITALS
HEIGHT: 59 IN | DIASTOLIC BLOOD PRESSURE: 66 MMHG | SYSTOLIC BLOOD PRESSURE: 136 MMHG | BODY MASS INDEX: 29.43 KG/M2 | HEART RATE: 74 BPM | WEIGHT: 146 LBS

## 2024-03-19 DIAGNOSIS — R07.89 CHEST TIGHTNESS: ICD-10-CM

## 2024-03-19 DIAGNOSIS — Z82.49 FAMILY HISTORY OF PREMATURE CAD: ICD-10-CM

## 2024-03-19 DIAGNOSIS — E78.5 DYSLIPIDEMIA: ICD-10-CM

## 2024-03-19 DIAGNOSIS — R06.02 SOB (SHORTNESS OF BREATH): Primary | ICD-10-CM

## 2024-03-19 PROCEDURE — 99204 OFFICE O/P NEW MOD 45 MIN: CPT | Performed by: INTERNAL MEDICINE

## 2024-03-19 PROCEDURE — 93000 ELECTROCARDIOGRAM COMPLETE: CPT | Performed by: INTERNAL MEDICINE

## 2024-03-19 PROCEDURE — 1123F ACP DISCUSS/DSCN MKR DOCD: CPT | Performed by: INTERNAL MEDICINE

## 2024-03-19 RX ORDER — PRAVASTATIN SODIUM 40 MG
40 TABLET ORAL DAILY
COMMUNITY

## 2024-03-19 NOTE — ASSESSMENT & PLAN NOTE
Patient is strong family history of premature coronary artery disease suspect symptoms related to possible underlying ischemic heart disease.  She is counseled to refrain from overexerting particularly if she is having symptoms and take it easy until we do more objective evaluation with stress Cardiolite and echocardiogram and follow-up.  Continue risk factor modification for primary prevention.

## 2024-03-19 NOTE — PROGRESS NOTES
Results   Component Value Date/Time    WBC 5.5 02/07/2024 11:03 AM    HGB 14.3 02/07/2024 11:03 AM    HCT 43.2 02/07/2024 11:03 AM     02/07/2024 11:03 AM     Lipids:   Component 09/15/23 04/28/22 12/12/19 01/07/19   Cholesterol, Total 185 189 208 High  223 High    Triglycerides 124 86 113 128   HDL Cholesterol 62 65 72 62   VLDL Cholesterol Mandeep 22 16 23 26   LDL CHOL CALC (RUST) 101 High  108 High   -- --     Component 09/18/23 09/18/23 09/15/23 04/28/22 12/12/19 01/07/19   Glucose 125 Abnormal  -- CANCELED  154 High  99 161 High    BUN 16 -- 19 21 16 15   Creatinine, Ser -- -- 0.78 0.74 0.75 0.64   eGFR CKD-EPI 2021CR -- -- 76 81 -- --   BUN/Creatinine Ratio -- -- 24 28 21 23   Sodium 141 -- 143 141 141 144   Potassium 4.0 -- CANCELED  4.3 4.1 4.0   Chloride 103 -- 103 103 104 106   CARBON DIOXIDE -- -- 21 25 19 Low  22   Calcium 9.8 -- 9.4 9.4 9.4 9.6   Protein, Total, Serum -- -- 7.0 6.8 7.2 6.9   Albumin -- 4.4 4.1 4.1 4.2 4.4   Globulin, Total -- -- 2.9 2.7 3.0 2.5   Albumin/Globulin Ratio -- -- 1.4 1.5 1.4 1.8   Bilirubin, Total -- -- 0.5 0.8 0.6 0.7   Alkaline Phosphatase, S -- -- 169 High  145 High  156 High  148 High    AST -- 33 33 27 35 30   ALT (SGPT) -- -- 30 23 -- --     omponent 09/18/23 09/15/23 04/28/22 12/12/19 01/07/19   Protein, Total 7.6 -- -- -- --   Albumin 4.4 4.1 4.1 4.2 4.4   Alkaline Phosphatase 125 Abnormal  -- -- -- --   AST 33 33 27 35 30   ALT 27 -- -- 26 23   Total Bilirubin 0.9 -- -- -- --   Direct Bilirubin 0.20 -- -- -- --     CT ABDOMEN AND PELVIS IMPRESSION: [facility] on 9/18/2023  1.  No CT evidence for acute traumatic injury or acute pathology in the abdomen and pelvis.   2.  Findings which may represent chronic liver disease with collateral vessels in the perisplenic region which may represent portal hypertension. Correlation with liver function tests may be of value as statically wanted.   3.  Cholelithiasis.     Follow-up right upper quadrant

## 2024-03-19 NOTE — ASSESSMENT & PLAN NOTE
Counseled for healthy lifestyle changes healthy diet and lipid lowering therapy for primary prevention.

## 2024-03-19 NOTE — PATIENT INSTRUCTIONS
Continue current cardiovascular medications which have been reviewed and discussed individually with you.   Multiple questions answered. Patient verbalizes understanding and asks relevant questions. Follow up in 4 weeks with echocardiogram and stress cardiolite, sooner if needed.

## 2024-04-09 ENCOUNTER — TELEPHONE (OUTPATIENT)
Dept: CARDIOLOGY CLINIC | Age: 84
End: 2024-04-09

## 2024-05-06 ENCOUNTER — OFFICE VISIT (OUTPATIENT)
Dept: CARDIOLOGY CLINIC | Age: 84
End: 2024-05-06
Payer: COMMERCIAL

## 2024-05-06 VITALS
BODY MASS INDEX: 28.7 KG/M2 | DIASTOLIC BLOOD PRESSURE: 74 MMHG | SYSTOLIC BLOOD PRESSURE: 132 MMHG | WEIGHT: 146.2 LBS | HEART RATE: 81 BPM | HEIGHT: 60 IN

## 2024-05-06 DIAGNOSIS — R07.89 CHEST TIGHTNESS: Primary | ICD-10-CM

## 2024-05-06 DIAGNOSIS — R06.02 SOB (SHORTNESS OF BREATH): ICD-10-CM

## 2024-05-06 DIAGNOSIS — Z82.49 FAMILY HISTORY OF PREMATURE CAD: ICD-10-CM

## 2024-05-06 DIAGNOSIS — E78.5 DYSLIPIDEMIA: ICD-10-CM

## 2024-05-06 PROCEDURE — 99213 OFFICE O/P EST LOW 20 MIN: CPT | Performed by: INTERNAL MEDICINE

## 2024-05-06 PROCEDURE — 1123F ACP DISCUSS/DSCN MKR DOCD: CPT | Performed by: INTERNAL MEDICINE

## 2024-05-06 NOTE — ASSESSMENT & PLAN NOTE
Symptoms have resolved since last visit and echocardiogram is negative for any wall motion abnormalities.  Stress test is pending insurance approval.

## 2024-05-06 NOTE — PROGRESS NOTES
De Dubois  1940  Karol Noble MD      Chief Complaint   Patient presents with    Hypertension    Hyperlipidemia    Follow-up     Follow up from Echo , Nuclear is still pending with insurance  Denies any chest pain, SOB dizziness, or palpitations  Still has swelling in both ankles       Chief complaint and HPI:  De Dubois  is a 83 y.o. female following up for chest tightness she reported on last visit while she was moving furniture.  Since then she has moved and has been going up and down stairs quite a bit without any chest pain or shortness of breath.  Echocardiogram was completed however her nuclear stress test has not been approved by the insurance yet.    Rest of the Cardiovascular system review is otherwise unchanged from prior encounter.  Past medical history:  has a past medical history of Acid reflux, Arthritis, Bladder prolapse, female, acquired, Bronchitis, Chest tightness, COPD (chronic obstructive pulmonary disease) (HCC), Emphysema of lung (HCC), Family history of premature CAD, H/O echocardiogram, [place] (hard of hearing), Hyperlipidemia, Hypertension, Kidney stone, Retention of urine, Tooth missing, Urinary incontinence, UTI (urinary tract infection), and Wears glasses.  Past surgical history:  has a past surgical history that includes Rotator cuff repair (Right, ); Dental surgery; Bladder surgery (Last Done In ); Cystoscopy (); Hysterectomy, total abdominal (Late s); Dilation and curettage of uterus (s); cyst removal (Right, 02/15/2017); Bladder surgery; Colonoscopy ('s); Colonoscopy (2017); Foot surgery (Left, 2020); and Ovary removal.  Social History:   Social History     Tobacco Use    Smoking status: Former     Current packs/day: 0.00     Average packs/day: 0.5 packs/day for 29.0 years (14.5 ttl pk-yrs)     Types: Cigarettes     Start date:      Quit date:      Years since quittin.3    Smokeless tobacco: Never   Substance

## 2024-05-06 NOTE — ASSESSMENT & PLAN NOTE
Symptoms have resolved since last visit.  Continue regular exercises and risk factor modification.

## 2024-05-06 NOTE — PATIENT INSTRUCTIONS
Counseled for 30 minutes a day of moderate intensity aerobic exercise like activity.  Primary prevention is the goal by aggressive risk modification, healthy and therapeutic life style changes for cardiovascular risk reduction. Various goals are discussed and questions answered.  After visit summery is provided.   Questions answered and patient verbalizes understanding. Follow up with PCP and see me as needed.

## 2024-10-24 ENCOUNTER — HOSPITAL ENCOUNTER (OUTPATIENT)
Dept: ULTRASOUND IMAGING | Age: 84
Discharge: HOME | End: 2024-10-24
Attending: INTERNAL MEDICINE
Payer: COMMERCIAL

## 2024-10-24 DIAGNOSIS — K74.60 HEPATIC CIRRHOSIS, UNSPECIFIED HEPATIC CIRRHOSIS TYPE, UNSPECIFIED WHETHER ASCITES PRESENT (HCC): ICD-10-CM

## 2024-10-24 PROCEDURE — 76705 ECHO EXAM OF ABDOMEN: CPT

## 2025-05-08 ENCOUNTER — APPOINTMENT (OUTPATIENT)
Dept: GENERAL RADIOLOGY | Age: 85
End: 2025-05-08
Payer: COMMERCIAL

## 2025-05-08 ENCOUNTER — HOSPITAL ENCOUNTER (EMERGENCY)
Age: 85
Discharge: HOME | End: 2025-05-08
Attending: STUDENT IN AN ORGANIZED HEALTH CARE EDUCATION/TRAINING PROGRAM
Payer: COMMERCIAL

## 2025-05-08 ENCOUNTER — APPOINTMENT (OUTPATIENT)
Dept: CT IMAGING | Age: 85
End: 2025-05-08
Payer: COMMERCIAL

## 2025-05-08 VITALS
HEIGHT: 60 IN | OXYGEN SATURATION: 95 % | TEMPERATURE: 98.3 F | WEIGHT: 151.6 LBS | HEART RATE: 82 BPM | BODY MASS INDEX: 29.76 KG/M2 | RESPIRATION RATE: 18 BRPM | DIASTOLIC BLOOD PRESSURE: 41 MMHG | SYSTOLIC BLOOD PRESSURE: 175 MMHG

## 2025-05-08 DIAGNOSIS — N39.0 URINARY TRACT INFECTION WITHOUT HEMATURIA, SITE UNSPECIFIED: Primary | ICD-10-CM

## 2025-05-08 DIAGNOSIS — M54.50 ACUTE LEFT-SIDED LOW BACK PAIN WITHOUT SCIATICA: ICD-10-CM

## 2025-05-08 LAB
BACTERIA URNS QL MICRO: ABNORMAL
BILIRUB UR QL STRIP: NEGATIVE
CHARACTER UR: ABNORMAL
CHP ED QC CHECK: YES
CLARITY UR: CLEAR
COLOR UR: YELLOW
EPI CELLS #/AREA URNS HPF: ABNORMAL /HPF
GLUCOSE BLD-MCNC: 186 MG/DL
GLUCOSE BLD-MCNC: 186 MG/DL (ref 74–99)
GLUCOSE UR STRIP-MCNC: 250 MG/DL
HGB UR QL STRIP.AUTO: ABNORMAL
KETONES UR STRIP-MCNC: NEGATIVE MG/DL
LEUKOCYTE ESTERASE UR QL STRIP: ABNORMAL
NITRITE UR QL STRIP: POSITIVE
PH UR STRIP: 6 [PH] (ref 5–8)
PROT UR STRIP-MCNC: ABNORMAL MG/DL
RBC #/AREA URNS HPF: ABNORMAL /HPF
SP GR UR STRIP: >1.03 (ref 1–1.03)
UROBILINOGEN UR STRIP-ACNC: 0.2 EU/DL (ref 0–1)
WBC #/AREA URNS HPF: ABNORMAL /HPF

## 2025-05-08 PROCEDURE — 96372 THER/PROPH/DIAG INJ SC/IM: CPT

## 2025-05-08 PROCEDURE — 87077 CULTURE AEROBIC IDENTIFY: CPT

## 2025-05-08 PROCEDURE — 6370000000 HC RX 637 (ALT 250 FOR IP): Performed by: STUDENT IN AN ORGANIZED HEALTH CARE EDUCATION/TRAINING PROGRAM

## 2025-05-08 PROCEDURE — 99284 EMERGENCY DEPT VISIT MOD MDM: CPT

## 2025-05-08 PROCEDURE — 73502 X-RAY EXAM HIP UNI 2-3 VIEWS: CPT

## 2025-05-08 PROCEDURE — 72131 CT LUMBAR SPINE W/O DYE: CPT

## 2025-05-08 PROCEDURE — 81001 URINALYSIS AUTO W/SCOPE: CPT

## 2025-05-08 PROCEDURE — 6360000002 HC RX W HCPCS: Performed by: STUDENT IN AN ORGANIZED HEALTH CARE EDUCATION/TRAINING PROGRAM

## 2025-05-08 PROCEDURE — 87086 URINE CULTURE/COLONY COUNT: CPT

## 2025-05-08 PROCEDURE — 82962 GLUCOSE BLOOD TEST: CPT

## 2025-05-08 PROCEDURE — 87186 SC STD MICRODIL/AGAR DIL: CPT

## 2025-05-08 RX ORDER — METHOCARBAMOL 500 MG/1
500 TABLET, FILM COATED ORAL 3 TIMES DAILY PRN
Qty: 9 TABLET | Refills: 0 | Status: SHIPPED | OUTPATIENT
Start: 2025-05-08 | End: 2025-05-11

## 2025-05-08 RX ORDER — CEPHALEXIN 500 MG/1
1000 CAPSULE ORAL ONCE
Status: COMPLETED | OUTPATIENT
Start: 2025-05-08 | End: 2025-05-08

## 2025-05-08 RX ORDER — LIDOCAINE 4 G/G
1 PATCH TOPICAL ONCE
Status: DISCONTINUED | OUTPATIENT
Start: 2025-05-08 | End: 2025-05-08 | Stop reason: HOSPADM

## 2025-05-08 RX ORDER — CEPHALEXIN 500 MG/1
500 CAPSULE ORAL 4 TIMES DAILY
Qty: 40 CAPSULE | Refills: 0 | Status: SHIPPED | OUTPATIENT
Start: 2025-05-08 | End: 2025-05-18

## 2025-05-08 RX ORDER — PHENAZOPYRIDINE HYDROCHLORIDE 100 MG/1
100 TABLET, FILM COATED ORAL 3 TIMES DAILY PRN
Qty: 9 TABLET | Refills: 0 | Status: SHIPPED | OUTPATIENT
Start: 2025-05-08 | End: 2025-05-11

## 2025-05-08 RX ORDER — KETOROLAC TROMETHAMINE 15 MG/ML
15 INJECTION, SOLUTION INTRAMUSCULAR; INTRAVENOUS ONCE
Status: COMPLETED | OUTPATIENT
Start: 2025-05-08 | End: 2025-05-08

## 2025-05-08 RX ORDER — PHENAZOPYRIDINE HYDROCHLORIDE 100 MG/1
200 TABLET, FILM COATED ORAL ONCE
Status: COMPLETED | OUTPATIENT
Start: 2025-05-08 | End: 2025-05-08

## 2025-05-08 RX ADMIN — KETOROLAC TROMETHAMINE 15 MG: 15 INJECTION, SOLUTION INTRAMUSCULAR; INTRAVENOUS at 18:01

## 2025-05-08 RX ADMIN — PHENAZOPYRIDINE 200 MG: 100 TABLET ORAL at 18:38

## 2025-05-08 RX ADMIN — CEPHALEXIN 1000 MG: 500 CAPSULE ORAL at 18:38

## 2025-05-08 ASSESSMENT — PAIN DESCRIPTION - LOCATION
LOCATION: BUTTOCKS;HIP
LOCATION: HIP
LOCATION: HIP;BUTTOCKS

## 2025-05-08 ASSESSMENT — PAIN - FUNCTIONAL ASSESSMENT
PAIN_FUNCTIONAL_ASSESSMENT: 0-10
PAIN_FUNCTIONAL_ASSESSMENT: PREVENTS OR INTERFERES SOME ACTIVE ACTIVITIES AND ADLS
PAIN_FUNCTIONAL_ASSESSMENT: ACTIVITIES ARE NOT PREVENTED
PAIN_FUNCTIONAL_ASSESSMENT: PREVENTS OR INTERFERES SOME ACTIVE ACTIVITIES AND ADLS

## 2025-05-08 ASSESSMENT — PAIN DESCRIPTION - PAIN TYPE: TYPE: ACUTE PAIN

## 2025-05-08 ASSESSMENT — PAIN SCALES - GENERAL
PAINLEVEL_OUTOF10: 10
PAINLEVEL_OUTOF10: 10
PAINLEVEL_OUTOF10: 6

## 2025-05-08 ASSESSMENT — PAIN DESCRIPTION - DESCRIPTORS
DESCRIPTORS: SHARP
DESCRIPTORS: DISCOMFORT
DESCRIPTORS: SHARP

## 2025-05-08 ASSESSMENT — LIFESTYLE VARIABLES
HOW OFTEN DO YOU HAVE A DRINK CONTAINING ALCOHOL: NEVER
HOW MANY STANDARD DRINKS CONTAINING ALCOHOL DO YOU HAVE ON A TYPICAL DAY: PATIENT DOES NOT DRINK

## 2025-05-08 ASSESSMENT — PAIN DESCRIPTION - ORIENTATION
ORIENTATION: LEFT

## 2025-05-08 ASSESSMENT — PAIN DESCRIPTION - FREQUENCY: FREQUENCY: CONTINUOUS

## 2025-05-08 ASSESSMENT — PAIN DESCRIPTION - ONSET: ONSET: SUDDEN

## 2025-05-08 NOTE — ED PROVIDER NOTES
Emergency Department Encounter      Patient: De Dubois  MRN: [id number]  : 1940  Date of Evaluation: 2025  PCP: Karol Noble MD  ED Provider:  Nelson Lopez DO    Triage Chief Complaint:    Hip Pain, Buttocks Pain, Groin Pain, and Hyperglycemia (Fell off a 2 step stool on Tuesday. Fell against dresser. Having pain to Lt hip buttocks area and into Lt groin. Started on metformin 2 weeks ago. This morning her BS was 147, she feels like that is too high. Also having symptoms of UTI. Burning on urination, and frequency.)    HPI:   De Dubois is a 84 y.o. female that presents to the emergency department for back pain and potential UTI.    Patient states 2 days ago, she was standing on a stepstool, she reports losing her balance and swinging off of the stepstool.  She used her left hand to catch herself on a cabinet and the left part of her hip hit the cabinet.  She did not fall all the way down to the ground.  She did not lose consciousness.  She is not on any blood thinning medications.  She declines any preceding chest pain or shortness of breath.  However she does continue to have pain.  There is no radiation of the pain and is localized to the left lower back.  She has tried Tylenol without much improvement.  She declines any midline back pain.  No lower extremity weakness numbness or tingling.  No saddle anesthesia.  No new onset loss of bowel or bladder function.  She does report urinating more frequently but declines any blood in her urine.  On my evaluation she declines any abdominal pain or upper flank pain.  No chest pain or shortness of breath.  In addition, she reports recently having some outpatient blood test and she was noted to be hyperglycemic and she was restarted on her metformin which she has been compliant with.  She has been checking her blood sugars and trying to limit the amount of carbohydrates in her diet and her most recent blood glucose this morning was 147.

## 2025-05-10 LAB
MICROORGANISM SPEC CULT: ABNORMAL
SPECIMEN DESCRIPTION: ABNORMAL

## 2025-05-11 ENCOUNTER — RESULTS FOLLOW-UP (OUTPATIENT)
Dept: EMERGENCY DEPT | Age: 85
End: 2025-05-11

## 2025-05-28 ENCOUNTER — TRANSCRIBE ORDERS (OUTPATIENT)
Dept: ADMINISTRATIVE | Age: 85
End: 2025-05-28

## 2025-05-28 DIAGNOSIS — K74.60 HEPATIC CIRRHOSIS, UNSPECIFIED HEPATIC CIRRHOSIS TYPE, UNSPECIFIED WHETHER ASCITES PRESENT (HCC): Primary | ICD-10-CM

## 2025-06-10 ENCOUNTER — HOSPITAL ENCOUNTER (OUTPATIENT)
Dept: CT IMAGING | Age: 85
Discharge: HOME | End: 2025-06-10
Payer: COMMERCIAL

## 2025-06-10 DIAGNOSIS — K74.60 HEPATIC CIRRHOSIS, UNSPECIFIED HEPATIC CIRRHOSIS TYPE, UNSPECIFIED WHETHER ASCITES PRESENT (HCC): ICD-10-CM

## 2025-06-10 LAB
EGFR, POC: 85 ML/MIN/1.73M2
POC CREATININE: 0.7 MG/DL (ref 0.5–1.2)

## 2025-06-10 PROCEDURE — 82565 ASSAY OF CREATININE: CPT

## 2025-06-10 PROCEDURE — 2500000003 HC RX 250 WO HCPCS: Performed by: NURSE PRACTITIONER

## 2025-06-10 PROCEDURE — 6360000004 HC RX CONTRAST MEDICATION: Performed by: NURSE PRACTITIONER

## 2025-06-10 PROCEDURE — 74177 CT ABD & PELVIS W/CONTRAST: CPT

## 2025-06-10 RX ORDER — SODIUM CHLORIDE 9 MG/ML
10 INJECTION, SOLUTION INTRAMUSCULAR; INTRAVENOUS; SUBCUTANEOUS PRN
Status: DISCONTINUED | OUTPATIENT
Start: 2025-06-10 | End: 2025-06-11 | Stop reason: HOSPADM

## 2025-06-10 RX ORDER — IOPAMIDOL 755 MG/ML
75 INJECTION, SOLUTION INTRAVASCULAR
Status: COMPLETED | OUTPATIENT
Start: 2025-06-10 | End: 2025-06-10

## 2025-06-10 RX ADMIN — IOPAMIDOL 75 ML: 755 INJECTION, SOLUTION INTRAVENOUS at 13:04

## 2025-06-10 RX ADMIN — SODIUM CHLORIDE, PRESERVATIVE FREE 10 ML: 5 INJECTION INTRAVENOUS at 13:00

## (undated) DEVICE — DRAPE,EXTREMITY,89X128,STERILE: Brand: MEDLINE

## (undated) DEVICE — ANESTHESIA CIRCUIT ADULT-LF: Brand: MEDLINE INDUSTRIES, INC.

## (undated) DEVICE — GLOVE SURG SZ 6 THK91MIL LTX FREE SYN POLYISOPRENE ANTI

## (undated) DEVICE — BANDAGE,ELASTIC,ESMARK,STERILE,4"X9',LF: Brand: MEDLINE

## (undated) DEVICE — CHLORAPREP 26ML ORANGE

## (undated) DEVICE — SUTURE VCRL SZ 3-0 L27IN ABSRB UD L17MM RB-1 1/2 CIR J215H

## (undated) DEVICE — SUTURE VCRL SZ 4-0 L18IN ABSRB UD L13MM P-3 3/8 CIR PRIM J494H

## (undated) DEVICE — GOWN,SIRUS,FABRNF,RAGLAN,L,ST,30/CS: Brand: MEDLINE

## (undated) DEVICE — YANKAUER,FLEXIBLE HANDLE,REGLR CAPACITY: Brand: MEDLINE INDUSTRIES, INC.

## (undated) DEVICE — SUTURE NONABSORBABLE MONOFILAMENT 4-0 P-3 18 IN ETHILON 699H

## (undated) DEVICE — TUBING, SUCTION, 1/4" X 10', STRAIGHT: Brand: MEDLINE

## (undated) DEVICE — BANDAGE,GAUZE,BULKEE II,4.5"X4.1YD,STRL: Brand: MEDLINE

## (undated) DEVICE — SPONGE GZ W4XL8IN COT WVN 12 PLY

## (undated) DEVICE — COUNTER NDL 40 COUNT HLD 70 FOAM BLK ADH W/ MAG

## (undated) DEVICE — PACK,BASIC,IX: Brand: MEDLINE

## (undated) DEVICE — CURITY NON-ADHERENT STRIPS: Brand: CURITY

## (undated) DEVICE — ELECTRODE,RADIOTRANSLUCENT,FOAM,5PK: Brand: MEDLINE

## (undated) DEVICE — GLOVE SURG SZ 65 THK91MIL LTX FREE SYN POLYISOPRENE

## (undated) DEVICE — MARKER SURG SKIN UTIL REGULAR/FINE 2 TIP W/ RUL AND 9 LBL

## (undated) DEVICE — INTENDED FOR TISSUE SEPARATION, AND OTHER PROCEDURES THAT REQUIRE A SHARP SURGICAL BLADE TO PUNCTURE OR CUT.: Brand: BARD-PARKER ® STAINLESS STEEL BLADES

## (undated) DEVICE — PENCIL,CAUTERY,ROCKER,PTFE,15'CORD: Brand: MEDLINE INDUSTRIES, INC.

## (undated) DEVICE — GAUZE,SPONGE,4"X4",16PLY,XRAY,STRL,LF: Brand: MEDLINE

## (undated) DEVICE — ELECTRODE ES AD CRDLSS PT RET REM POLYHESIVE

## (undated) DEVICE — SYRINGE IRRIG 60ML SFT PLIABLE BLB EZ TO GRP 1 HND USE W/

## (undated) DEVICE — BANDAGE ACE ELASTIC ECON  4.0INX4.5YD

## (undated) DEVICE — LINER SUCT CANSTR 1500CC SEMI RIG W/ POR HYDROPHOBIC SHUT